# Patient Record
Sex: FEMALE | Race: WHITE | NOT HISPANIC OR LATINO
[De-identification: names, ages, dates, MRNs, and addresses within clinical notes are randomized per-mention and may not be internally consistent; named-entity substitution may affect disease eponyms.]

---

## 2017-01-23 ENCOUNTER — RESULT REVIEW (OUTPATIENT)
Age: 47
End: 2017-01-23

## 2017-06-23 ENCOUNTER — APPOINTMENT (OUTPATIENT)
Dept: MRI IMAGING | Facility: IMAGING CENTER | Age: 47
End: 2017-06-23

## 2017-06-29 ENCOUNTER — FORM ENCOUNTER (OUTPATIENT)
Age: 47
End: 2017-06-29

## 2017-06-30 ENCOUNTER — APPOINTMENT (OUTPATIENT)
Dept: MRI IMAGING | Facility: IMAGING CENTER | Age: 47
End: 2017-06-30

## 2017-06-30 ENCOUNTER — OUTPATIENT (OUTPATIENT)
Dept: OUTPATIENT SERVICES | Facility: HOSPITAL | Age: 47
LOS: 1 days | End: 2017-06-30
Payer: COMMERCIAL

## 2017-06-30 DIAGNOSIS — Z00.8 ENCOUNTER FOR OTHER GENERAL EXAMINATION: ICD-10-CM

## 2017-06-30 PROCEDURE — C8908: CPT

## 2017-06-30 PROCEDURE — C8937: CPT

## 2017-06-30 PROCEDURE — A9585: CPT

## 2017-07-06 ENCOUNTER — EMERGENCY (EMERGENCY)
Facility: HOSPITAL | Age: 47
LOS: 1 days | Discharge: HOME | End: 2017-07-06
Attending: INTERNAL MEDICINE | Admitting: INTERNAL MEDICINE
Payer: COMMERCIAL

## 2017-07-06 VITALS
HEART RATE: 85 BPM | DIASTOLIC BLOOD PRESSURE: 81 MMHG | HEIGHT: 66 IN | RESPIRATION RATE: 15 BRPM | WEIGHT: 156.09 LBS | OXYGEN SATURATION: 98 % | SYSTOLIC BLOOD PRESSURE: 120 MMHG | TEMPERATURE: 98 F

## 2017-07-06 DIAGNOSIS — Z88.0 ALLERGY STATUS TO PENICILLIN: ICD-10-CM

## 2017-07-06 DIAGNOSIS — R00.2 PALPITATIONS: ICD-10-CM

## 2017-07-06 DIAGNOSIS — R04.0 EPISTAXIS: ICD-10-CM

## 2017-07-06 DIAGNOSIS — M79.7 FIBROMYALGIA: ICD-10-CM

## 2017-07-06 DIAGNOSIS — R07.89 OTHER CHEST PAIN: ICD-10-CM

## 2017-07-06 PROCEDURE — 99284 EMERGENCY DEPT VISIT MOD MDM: CPT | Mod: 25

## 2017-07-06 NOTE — ED ADULT NURSE NOTE - PSH
S/P Breast Implant (B/L) '03    S/P Carpal Tunnel Release (R) '03    s/p Elective Breast Implant Exchange x3

## 2017-07-06 NOTE — ED ADULT NURSE NOTE - OBJECTIVE STATEMENT
Pt states she had nose bleed earlier today followed by palpitations- pt cont to feel palpitations but improved now- Pt states she had nose bleed earlier today followed by headache, pleuritic chest pain and  palpitations- pt cont to feel palpitations but improved now-

## 2017-07-07 VITALS
HEART RATE: 80 BPM | OXYGEN SATURATION: 98 % | DIASTOLIC BLOOD PRESSURE: 76 MMHG | SYSTOLIC BLOOD PRESSURE: 126 MMHG | RESPIRATION RATE: 14 BRPM | TEMPERATURE: 98 F

## 2017-07-07 LAB
ALBUMIN SERPL ELPH-MCNC: 3.7 G/DL — SIGNIFICANT CHANGE UP (ref 3.3–5)
ALP SERPL-CCNC: 75 U/L — SIGNIFICANT CHANGE UP (ref 40–120)
ALT FLD-CCNC: 47 U/L — SIGNIFICANT CHANGE UP (ref 12–78)
ANION GAP SERPL CALC-SCNC: 4 MMOL/L — LOW (ref 5–17)
AST SERPL-CCNC: 44 U/L — HIGH (ref 15–37)
BILIRUB SERPL-MCNC: 0.3 MG/DL — SIGNIFICANT CHANGE UP (ref 0.2–1.2)
BUN SERPL-MCNC: 22 MG/DL — SIGNIFICANT CHANGE UP (ref 7–23)
CALCIUM SERPL-MCNC: 8.7 MG/DL — SIGNIFICANT CHANGE UP (ref 8.5–10.1)
CHLORIDE SERPL-SCNC: 107 MMOL/L — SIGNIFICANT CHANGE UP (ref 96–108)
CK SERPL-CCNC: 118 U/L — SIGNIFICANT CHANGE UP (ref 26–192)
CO2 SERPL-SCNC: 32 MMOL/L — HIGH (ref 22–31)
CREAT SERPL-MCNC: 0.8 MG/DL — SIGNIFICANT CHANGE UP (ref 0.5–1.3)
D DIMER BLD IA.RAPID-MCNC: <150 NG/ML DDU — SIGNIFICANT CHANGE UP
GLUCOSE SERPL-MCNC: 104 MG/DL — HIGH (ref 70–99)
HCT VFR BLD CALC: 37.4 % — SIGNIFICANT CHANGE UP (ref 34.5–45)
HGB BLD-MCNC: 13.4 G/DL — SIGNIFICANT CHANGE UP (ref 11.5–15.5)
INR BLD: 0.92 RATIO — SIGNIFICANT CHANGE UP (ref 0.88–1.16)
MCHC RBC-ENTMCNC: 32.7 PG — SIGNIFICANT CHANGE UP (ref 27–34)
MCHC RBC-ENTMCNC: 35.9 GM/DL — SIGNIFICANT CHANGE UP (ref 32–36)
MCV RBC AUTO: 91.2 FL — SIGNIFICANT CHANGE UP (ref 80–100)
PLATELET # BLD AUTO: 285 K/UL — SIGNIFICANT CHANGE UP (ref 150–400)
POTASSIUM SERPL-MCNC: 4.1 MMOL/L — SIGNIFICANT CHANGE UP (ref 3.5–5.3)
POTASSIUM SERPL-SCNC: 4.1 MMOL/L — SIGNIFICANT CHANGE UP (ref 3.5–5.3)
PROT SERPL-MCNC: 7 G/DL — SIGNIFICANT CHANGE UP (ref 6–8.3)
PROTHROM AB SERPL-ACNC: 10 SEC — SIGNIFICANT CHANGE UP (ref 9.8–12.7)
RBC # BLD: 4.1 M/UL — SIGNIFICANT CHANGE UP (ref 3.8–5.2)
RBC # FLD: 11.8 % — SIGNIFICANT CHANGE UP (ref 10.3–14.5)
SODIUM SERPL-SCNC: 143 MMOL/L — SIGNIFICANT CHANGE UP (ref 135–145)
TROPONIN I SERPL-MCNC: <.015 NG/ML — SIGNIFICANT CHANGE UP (ref 0.01–0.04)
WBC # BLD: 7.1 K/UL — SIGNIFICANT CHANGE UP (ref 3.8–10.5)
WBC # FLD AUTO: 7.1 K/UL — SIGNIFICANT CHANGE UP (ref 3.8–10.5)

## 2017-07-07 PROCEDURE — 99283 EMERGENCY DEPT VISIT LOW MDM: CPT | Mod: 25

## 2017-07-07 PROCEDURE — 93005 ELECTROCARDIOGRAM TRACING: CPT

## 2017-07-07 PROCEDURE — 84484 ASSAY OF TROPONIN QUANT: CPT

## 2017-07-07 PROCEDURE — 85379 FIBRIN DEGRADATION QUANT: CPT

## 2017-07-07 PROCEDURE — 82550 ASSAY OF CK (CPK): CPT

## 2017-07-07 PROCEDURE — 36415 COLL VENOUS BLD VENIPUNCTURE: CPT

## 2017-07-07 PROCEDURE — 71046 X-RAY EXAM CHEST 2 VIEWS: CPT

## 2017-07-07 PROCEDURE — 80053 COMPREHEN METABOLIC PANEL: CPT

## 2017-07-07 PROCEDURE — 71020: CPT | Mod: 26

## 2017-07-07 PROCEDURE — 85027 COMPLETE CBC AUTOMATED: CPT

## 2017-07-07 PROCEDURE — 85610 PROTHROMBIN TIME: CPT

## 2017-07-07 NOTE — ED PROVIDER NOTE - OBJECTIVE STATEMENT
47 y/o w/m who experienced a constellation of symptoms that began with nasal bleeding controlled with pressure, head pounding, palpitations, and pleuritic chest pains. The palpitations are resolved.

## 2017-07-07 NOTE — ED PROVIDER NOTE - CARE PLAN
Principal Discharge DX:	Palpitations  Secondary Diagnosis:	Atypical chest pain  Secondary Diagnosis:	Epistaxis

## 2017-07-07 NOTE — ED ADULT NURSE REASSESSMENT NOTE - NS ED NURSE REASSESS COMMENT FT1
Pt feeling better- all testing within acceptable limits- verbalized understanding of d/c instruction-ambulated from ER in stable condition-

## 2017-07-07 NOTE — ED PROVIDER NOTE - ENMT, MLM
Airway patent, Nasal mucosa clear. Mouth with normal mucosa. Throat has no vesicles, no oropharyngeal exudates and uvula is midline. Nasal no active bleeding

## 2017-07-24 ENCOUNTER — RESULT REVIEW (OUTPATIENT)
Age: 47
End: 2017-07-24

## 2017-08-02 ENCOUNTER — LABORATORY RESULT (OUTPATIENT)
Age: 47
End: 2017-08-02

## 2017-09-03 ENCOUNTER — TRANSCRIPTION ENCOUNTER (OUTPATIENT)
Age: 47
End: 2017-09-03

## 2017-09-19 ENCOUNTER — APPOINTMENT (OUTPATIENT)
Dept: PLASTIC SURGERY | Facility: CLINIC | Age: 47
End: 2017-09-19
Payer: SELF-PAY

## 2017-09-19 VITALS
TEMPERATURE: 97.6 F | HEART RATE: 91 BPM | SYSTOLIC BLOOD PRESSURE: 132 MMHG | WEIGHT: 158.03 LBS | BODY MASS INDEX: 25.4 KG/M2 | DIASTOLIC BLOOD PRESSURE: 76 MMHG | RESPIRATION RATE: 16 BRPM | OXYGEN SATURATION: 100 % | HEIGHT: 66 IN

## 2017-09-19 PROCEDURE — ZZZZZ: CPT

## 2017-11-09 ENCOUNTER — LABORATORY RESULT (OUTPATIENT)
Age: 47
End: 2017-11-09

## 2017-11-11 ENCOUNTER — FORM ENCOUNTER (OUTPATIENT)
Age: 47
End: 2017-11-11

## 2017-11-12 ENCOUNTER — APPOINTMENT (OUTPATIENT)
Dept: MRI IMAGING | Facility: CLINIC | Age: 47
End: 2017-11-12
Payer: COMMERCIAL

## 2017-11-12 ENCOUNTER — OUTPATIENT (OUTPATIENT)
Dept: OUTPATIENT SERVICES | Facility: HOSPITAL | Age: 47
LOS: 1 days | End: 2017-11-12
Payer: COMMERCIAL

## 2017-11-12 DIAGNOSIS — Z00.8 ENCOUNTER FOR OTHER GENERAL EXAMINATION: ICD-10-CM

## 2017-11-12 PROCEDURE — 70553 MRI BRAIN STEM W/O & W/DYE: CPT

## 2017-11-12 PROCEDURE — A9585: CPT

## 2017-11-12 PROCEDURE — 70553 MRI BRAIN STEM W/O & W/DYE: CPT | Mod: 26

## 2017-11-13 ENCOUNTER — LABORATORY RESULT (OUTPATIENT)
Age: 47
End: 2017-11-13

## 2018-02-12 ENCOUNTER — APPOINTMENT (OUTPATIENT)
Dept: PLASTIC SURGERY | Facility: CLINIC | Age: 48
End: 2018-02-12
Payer: SELF-PAY

## 2018-06-11 ENCOUNTER — APPOINTMENT (OUTPATIENT)
Dept: PLASTIC SURGERY | Facility: CLINIC | Age: 48
End: 2018-06-11
Payer: SELF-PAY

## 2018-06-11 DIAGNOSIS — Z41.1 ENCOUNTER FOR COSMETIC SURGERY: ICD-10-CM

## 2018-08-25 ENCOUNTER — TRANSCRIPTION ENCOUNTER (OUTPATIENT)
Age: 48
End: 2018-08-25

## 2018-09-10 PROBLEM — M79.7 FIBROMYALGIA: Chronic | Status: ACTIVE | Noted: 2017-07-06

## 2018-09-15 ENCOUNTER — EMERGENCY (EMERGENCY)
Facility: HOSPITAL | Age: 48
LOS: 1 days | Discharge: HOME | End: 2018-09-15
Attending: EMERGENCY MEDICINE
Payer: COMMERCIAL

## 2018-09-15 VITALS
HEART RATE: 83 BPM | HEIGHT: 65 IN | SYSTOLIC BLOOD PRESSURE: 177 MMHG | RESPIRATION RATE: 18 BRPM | OXYGEN SATURATION: 99 % | WEIGHT: 130.07 LBS | TEMPERATURE: 97 F | DIASTOLIC BLOOD PRESSURE: 77 MMHG

## 2018-09-15 VITALS
SYSTOLIC BLOOD PRESSURE: 130 MMHG | HEART RATE: 77 BPM | TEMPERATURE: 98 F | RESPIRATION RATE: 18 BRPM | DIASTOLIC BLOOD PRESSURE: 67 MMHG | OXYGEN SATURATION: 100 %

## 2018-09-15 LAB
CK MB BLD-MCNC: 1.8 % — SIGNIFICANT CHANGE UP (ref 0–3.5)
CK MB CFR SERPL CALC: 2.4 NG/ML — SIGNIFICANT CHANGE UP (ref 0–3.8)
CK SERPL-CCNC: 130 U/L — SIGNIFICANT CHANGE UP (ref 25–170)
TROPONIN T, HIGH SENSITIVITY RESULT: <6 NG/L — SIGNIFICANT CHANGE UP (ref 0–51)

## 2018-09-15 PROCEDURE — 82550 ASSAY OF CK (CPK): CPT

## 2018-09-15 PROCEDURE — 82553 CREATINE MB FRACTION: CPT

## 2018-09-15 PROCEDURE — 99283 EMERGENCY DEPT VISIT LOW MDM: CPT

## 2018-09-15 PROCEDURE — 84484 ASSAY OF TROPONIN QUANT: CPT

## 2018-09-15 PROCEDURE — 99284 EMERGENCY DEPT VISIT MOD MDM: CPT

## 2018-09-15 RX ORDER — IBUPROFEN 200 MG
600 TABLET ORAL ONCE
Qty: 0 | Refills: 0 | Status: COMPLETED | OUTPATIENT
Start: 2018-09-15 | End: 2018-09-15

## 2018-09-15 RX ADMIN — Medication 600 MILLIGRAM(S): at 15:18

## 2018-09-15 RX ADMIN — Medication 600 MILLIGRAM(S): at 15:48

## 2018-09-15 NOTE — ED PROVIDER NOTE - NS ED ROS FT
Constitutional: denies fevers, chills, night sweats, weight loss  HEENT: denies visual changes, hearing changes, rhinitis, odynophagia, or dysphagia  Cardiovascular: denies edema  Respiratory: denies SOB, wheezing  Gastrointestinal: denies N/V/D, abdominal pain, hematochezia, melena  : denies dysuria, hematuria  MSK: denies weakness, joint pain  Skin: denies new rashes or masses  Heme: denies bleeding  Neuro: denies headache

## 2018-09-15 NOTE — ED PROVIDER NOTE - PROGRESS NOTE DETAILS
Pt condition improved after motrin. Offered pt xray however pt declined. Risks and benefits of declining xray explained to patient who wished to follow up as an outpt on Monday if symptoms persist. Pt without signs of infection or ischemia. Ok to discharge at this time. No cough.

## 2018-09-15 NOTE — ED PROVIDER NOTE - OBJECTIVE STATEMENT
The patient is a 48y Female with a PMH of fibromyalgia, IBS, palpitations, and an active UTI (being treated with sulfamethoxazole started on 9/12/18) who presents to the ED complaining of chest pain, numbness in her finger tips, lightheadedness. She reports that she was awoken from sleep at 0500 by the pain. She reports it is tender to palpation, and worsened by deep inspiration. The pain is non radiating and sharp in nature. She goes to kick boxing 2-3 times per week and has not had these symptoms previously. She reports some SOB and palpitations since the onset of the chest pain. She reports she has a long hx of palpitations and was given a heart monitor for sometime but never an official dx and is not on any medications for the palpitations. She denies cardiac hx. She has had some nausea however denies V/D and is tolerating PO. She reports the numbness in her fingertips is improving.

## 2018-09-15 NOTE — ED ADULT NURSE NOTE - OBJECTIVE STATEMENT
48 yrs old female with no medical history present to the ER for left sided chest pain associated with numbness to left hand. Pt report that she does kick boxing 3 days a week . As per pt she started to experience reproducible left side chest pain @5am this morning ; reports pain level of 6/10 when she moves her arm and aching in quality. Pt denies taking any OTC meds for pain. Pt also report feeling palpitations which she state has resolved. Pt appears anxious and report family history of heart attack. Denies nausea, vomiting /diarrhea/ dizziness.

## 2018-09-15 NOTE — ED ADULT NURSE NOTE - CHPI ED NUR SYMPTOMS NEG
no vomiting/no chills/no diaphoresis/no fever/no nausea/no back pain/no congestion/no dizziness/no syncope/no shortness of breath

## 2018-09-15 NOTE — ED PROVIDER NOTE - ATTENDING CONTRIBUTION TO CARE
49 yo female, hx of palpitations of unknown etiology, previously worked up, now with intermittent sharp mild CP this AM, no LOC.  On my exam, well appearing, smiling, conversant, a bit of an anxious affect.  EKG without STEMI.  Will check labs/high sensitivity trop, reassess, r/o ACS.  PERC negative, doubt PE.

## 2018-09-15 NOTE — ED PROVIDER NOTE - PHYSICAL EXAMINATION
GEN: calm, cooperative, ENT: mucous membranes moist, HEAD: NCAT, CV: S1 S2 no murmurs, RESP: no respiratory distress, ABD: no abdominal TTP, MSK: moves all extremities, NEURO: awake, alert, oriented, PSYCH: anxious affect

## 2018-09-30 ENCOUNTER — FORM ENCOUNTER (OUTPATIENT)
Age: 48
End: 2018-09-30

## 2018-10-01 ENCOUNTER — OUTPATIENT (OUTPATIENT)
Dept: OUTPATIENT SERVICES | Facility: HOSPITAL | Age: 48
LOS: 1 days | End: 2018-10-01
Payer: COMMERCIAL

## 2018-10-01 ENCOUNTER — APPOINTMENT (OUTPATIENT)
Dept: RADIOLOGY | Facility: IMAGING CENTER | Age: 48
End: 2018-10-01
Payer: COMMERCIAL

## 2018-10-01 ENCOUNTER — OTHER (OUTPATIENT)
Age: 48
End: 2018-10-01

## 2018-10-01 DIAGNOSIS — Z00.8 ENCOUNTER FOR OTHER GENERAL EXAMINATION: ICD-10-CM

## 2018-10-01 PROBLEM — K58.9 IRRITABLE BOWEL SYNDROME WITHOUT DIARRHEA: Chronic | Status: ACTIVE | Noted: 2018-09-15

## 2018-10-01 PROCEDURE — 73120 X-RAY EXAM OF HAND: CPT

## 2018-10-01 PROCEDURE — 73120 X-RAY EXAM OF HAND: CPT | Mod: 26,RT

## 2018-10-01 PROCEDURE — 73110 X-RAY EXAM OF WRIST: CPT

## 2018-10-01 PROCEDURE — 73110 X-RAY EXAM OF WRIST: CPT | Mod: 26,RT

## 2018-10-04 ENCOUNTER — OUTPATIENT (OUTPATIENT)
Dept: OUTPATIENT SERVICES | Facility: HOSPITAL | Age: 48
LOS: 1 days | End: 2018-10-04

## 2018-10-04 VITALS
HEART RATE: 80 BPM | DIASTOLIC BLOOD PRESSURE: 70 MMHG | SYSTOLIC BLOOD PRESSURE: 110 MMHG | WEIGHT: 130.95 LBS | HEIGHT: 64.5 IN | TEMPERATURE: 98 F | RESPIRATION RATE: 15 BRPM

## 2018-10-04 DIAGNOSIS — S62.024A NONDISPLACED FRACTURE OF MIDDLE THIRD OF NAVICULAR [SCAPHOID] BONE OF RIGHT WRIST, INITIAL ENCOUNTER FOR CLOSED FRACTURE: ICD-10-CM

## 2018-10-04 DIAGNOSIS — S62.109A FRACTURE OF UNSPECIFIED CARPAL BONE, UNSPECIFIED WRIST, INITIAL ENCOUNTER FOR CLOSED FRACTURE: ICD-10-CM

## 2018-10-04 LAB
HCT VFR BLD CALC: 37.1 % — SIGNIFICANT CHANGE UP (ref 34.5–45)
HGB BLD-MCNC: 11.9 G/DL — SIGNIFICANT CHANGE UP (ref 11.5–15.5)
MCHC RBC-ENTMCNC: 30 PG — SIGNIFICANT CHANGE UP (ref 27–34)
MCHC RBC-ENTMCNC: 32.1 % — SIGNIFICANT CHANGE UP (ref 32–36)
MCV RBC AUTO: 93.5 FL — SIGNIFICANT CHANGE UP (ref 80–100)
NRBC # FLD: 0 — SIGNIFICANT CHANGE UP
PLATELET # BLD AUTO: 281 K/UL — SIGNIFICANT CHANGE UP (ref 150–400)
PMV BLD: 10.2 FL — SIGNIFICANT CHANGE UP (ref 7–13)
RBC # BLD: 3.97 M/UL — SIGNIFICANT CHANGE UP (ref 3.8–5.2)
RBC # FLD: 12.4 % — SIGNIFICANT CHANGE UP (ref 10.3–14.5)
WBC # BLD: 3.76 K/UL — LOW (ref 3.8–10.5)
WBC # FLD AUTO: 3.76 K/UL — LOW (ref 3.8–10.5)

## 2018-10-04 NOTE — H&P PST ADULT - SKIN
Render Post-Care Instructions In Note?: no
Number Of Freeze-Thaw Cycles: 1 freeze-thaw cycle
Duration Of Freeze Thaw-Cycle (Seconds): 5
Detail Level: Simple
Pared With?: 15 blade
Detail Level: Detailed
Consent: Advised patient of the risk of possible scars and pigmentation at LN2 site, patient states she understand the risk
Medical Necessity Information: It is in your best interest to select a reason for this procedure from the list below. All of these items fulfill various CMS LCD requirements except the new and changing color options.
No lesions; no rash

## 2018-10-04 NOTE — H&P PST ADULT - ASSESSMENT
49 y/o female with right wrist fracture.  Pt reports she punched a wall and hit a stud on 10/1/18. Scheduled for Right scaphoid fracture ORIF 10/8/18.

## 2018-10-04 NOTE — H&P PST ADULT - NSANTHOSAYNRD_GEN_A_CORE
No. JUANY screening performed.  STOP BANG Legend: 0-2 = LOW Risk; 3-4 = INTERMEDIATE Risk; 5-8 = HIGH Risk

## 2018-10-04 NOTE — H&P PST ADULT - PROBLEM SELECTOR PLAN 1
Right scaphoid fracture ORIF 10/8/18.     CBC     Pre-op instructions, and  antibacterial soap given and explained

## 2018-10-04 NOTE — H&P PST ADULT - HISTORY OF PRESENT ILLNESS
47 y/o female with right wrist fracture.  Pt reports she punched a wall and hit a stud on 10/1/18. Scheduled for Right scaphoid fracture ORIF 10/8/18.

## 2018-10-04 NOTE — H&P PST ADULT - MUSCULOSKELETAL COMMENTS
47 y/o female with right wrist fracture.  Pt reports she punched a wall and hit a stud on 10/1/18. Scheduled for Right scaphoid fracture ORIF 10/8/18. Pt reports she punched a wall and hit a stud on 10/1/18, sustained right wrist fx.  Scheduled for Right scaphoid fracture ORIF 10/8/18. right wrist with splint and ace wrap. Fingers, right hand warm mobile, sensation intact to touch

## 2018-10-04 NOTE — H&P PST ADULT - SKIN/BREAST COMMENTS
Pt reports breat augmentation 2004, with several revisions due to leakage of implants,  most recently 2012t

## 2018-10-04 NOTE — H&P PST ADULT - PMH
Fibromyalgia    IBS (irritable bowel syndrome) Fibromyalgia    IBS (irritable bowel syndrome)    Wrist fracture  right

## 2018-10-04 NOTE — H&P PST ADULT - PSH
S/P Breast Implant (B/L) '03    S/P Carpal Tunnel Release (R) '03    s/p Elective Breast Implant Exchange x3 S/P Breast Implant (B/L) '03  most recently 2012  S/P Carpal Tunnel Release (R) '03    s/p Elective Breast Implant Exchange x3

## 2018-10-04 NOTE — H&P PST ADULT - FAMILY HISTORY
Father  Still living? No  Family history of acute myocardial infarction, Age at diagnosis: Age Unknown     Mother  Still living? Yes, Estimated age: Age Unknown  Family history of breast cancer, Age at diagnosis: Age Unknown

## 2018-10-04 NOTE — H&P PST ADULT - NEGATIVE NEUROLOGICAL SYMPTOMS
no transient paralysis/no paresthesias/no loss of consciousness/no hemiparesis/no confusion/no difficulty walking/no tremors/no loss of sensation/no facial palsy/no weakness/no generalized seizures/no syncope/no focal seizures/no vertigo

## 2018-10-07 ENCOUNTER — TRANSCRIPTION ENCOUNTER (OUTPATIENT)
Age: 48
End: 2018-10-07

## 2018-10-08 ENCOUNTER — OUTPATIENT (OUTPATIENT)
Dept: OUTPATIENT SERVICES | Facility: HOSPITAL | Age: 48
LOS: 1 days | Discharge: HOME | End: 2018-10-08

## 2018-10-08 VITALS
HEART RATE: 73 BPM | DIASTOLIC BLOOD PRESSURE: 54 MMHG | SYSTOLIC BLOOD PRESSURE: 119 MMHG | WEIGHT: 130.95 LBS | TEMPERATURE: 98 F | OXYGEN SATURATION: 100 % | HEIGHT: 64.5 IN | RESPIRATION RATE: 16 BRPM

## 2018-10-08 VITALS
TEMPERATURE: 98 F | SYSTOLIC BLOOD PRESSURE: 121 MMHG | OXYGEN SATURATION: 100 % | DIASTOLIC BLOOD PRESSURE: 76 MMHG | HEART RATE: 71 BPM | RESPIRATION RATE: 15 BRPM

## 2018-10-08 DIAGNOSIS — S62.024A NONDISPLACED FRACTURE OF MIDDLE THIRD OF NAVICULAR [SCAPHOID] BONE OF RIGHT WRIST, INITIAL ENCOUNTER FOR CLOSED FRACTURE: ICD-10-CM

## 2018-10-08 RX ORDER — OXYCODONE HYDROCHLORIDE 5 MG/1
1 TABLET ORAL
Qty: 15 | Refills: 0 | OUTPATIENT
Start: 2018-10-08

## 2018-10-08 NOTE — ASU DISCHARGE PLAN (ADULT/PEDIATRIC). - NOTIFY
Unable to Urinate/Fever greater than 101/Numbness, color, or temperature change to extremity/Persistent Nausea and Vomiting/Pain not relieved by Medications/Swelling that continues/Inability to Tolerate Liquids or Foods/Bleeding that does not stop

## 2018-10-08 NOTE — ASU DISCHARGE PLAN (ADULT/PEDIATRIC). - NURSING INSTRUCTIONS
Don't take Tylenol with percocet as there is Tylenol in percocet  Ice 20 minutes on/ 20 minutes off   Take stool softener as needed for possible constipation from pain meds

## 2018-10-08 NOTE — ASU DISCHARGE PLAN (ADULT/PEDIATRIC). - ACTIVITY LEVEL
See sheet no sports/gym/no swimming, no hot tubs/no exercise/elevate extremity/no heavy lifting/no tub baths

## 2018-10-08 NOTE — ASU DISCHARGE PLAN (ADULT/PEDIATRIC). - MEDICATION SUMMARY - MEDICATIONS TO TAKE
I will START or STAY ON the medications listed below when I get home from the hospital:    acetaminophen-oxyCODONE 325 mg-5 mg oral tablet  -- 1-2 tab(s) by mouth every 4-6 hours, As Needed MDD:6  -- Caution federal law prohibits the transfer of this drug to any person other  than the person for whom it was prescribed.  May cause drowsiness.  Alcohol may intensify this effect.  Use care when operating dangerous machinery.  This prescription cannot be refilled.  This product contains acetaminophen.  Do not use  with any other product containing acetaminophen to prevent possible liver damage.  Using more of this medication than prescribed may cause serious breathing problems.    -- Indication: For Pain control    Linzess 290 mcg oral capsule  -- orally 2 times a day  -- Indication: For Home med

## 2018-10-30 ENCOUNTER — APPOINTMENT (OUTPATIENT)
Dept: OPHTHALMOLOGY | Facility: CLINIC | Age: 48
End: 2018-10-30

## 2018-12-27 PROBLEM — S62.109A FRACTURE OF UNSPECIFIED CARPAL BONE, UNSPECIFIED WRIST, INITIAL ENCOUNTER FOR CLOSED FRACTURE: Chronic | Status: ACTIVE | Noted: 2018-10-04

## 2019-01-02 ENCOUNTER — OUTPATIENT (OUTPATIENT)
Dept: OUTPATIENT SERVICES | Facility: HOSPITAL | Age: 49
LOS: 1 days | Discharge: HOME | End: 2019-01-02
Payer: MEDICARE

## 2019-01-02 VITALS
HEART RATE: 76 BPM | DIASTOLIC BLOOD PRESSURE: 77 MMHG | WEIGHT: 129.85 LBS | RESPIRATION RATE: 18 BRPM | TEMPERATURE: 98 F | OXYGEN SATURATION: 100 % | HEIGHT: 65 IN | SYSTOLIC BLOOD PRESSURE: 113 MMHG

## 2019-01-02 DIAGNOSIS — K58.9 IRRITABLE BOWEL SYNDROME WITHOUT DIARRHEA: ICD-10-CM

## 2019-01-02 DIAGNOSIS — Z01.818 ENCOUNTER FOR OTHER PREPROCEDURAL EXAMINATION: ICD-10-CM

## 2019-01-02 DIAGNOSIS — M20.21 HALLUX RIGIDUS, RIGHT FOOT: ICD-10-CM

## 2019-01-02 DIAGNOSIS — Z98.890 OTHER SPECIFIED POSTPROCEDURAL STATES: Chronic | ICD-10-CM

## 2019-01-02 DIAGNOSIS — M79.673 PAIN IN UNSPECIFIED FOOT: ICD-10-CM

## 2019-01-02 DIAGNOSIS — Z01.812 ENCOUNTER FOR PREPROCEDURAL LABORATORY EXAMINATION: ICD-10-CM

## 2019-01-02 LAB
ANION GAP SERPL CALC-SCNC: 6 MMOL/L — SIGNIFICANT CHANGE UP (ref 5–17)
BASOPHILS # BLD AUTO: 0.04 K/UL — SIGNIFICANT CHANGE UP (ref 0–0.2)
BASOPHILS NFR BLD AUTO: 1.2 % — SIGNIFICANT CHANGE UP (ref 0–2)
BUN SERPL-MCNC: 20 MG/DL — SIGNIFICANT CHANGE UP (ref 7–23)
CALCIUM SERPL-MCNC: 8.6 MG/DL — SIGNIFICANT CHANGE UP (ref 8.5–10.1)
CHLORIDE SERPL-SCNC: 105 MMOL/L — SIGNIFICANT CHANGE UP (ref 96–108)
CO2 SERPL-SCNC: 31 MMOL/L — SIGNIFICANT CHANGE UP (ref 22–31)
CREAT SERPL-MCNC: 0.66 MG/DL — SIGNIFICANT CHANGE UP (ref 0.5–1.3)
EOSINOPHIL # BLD AUTO: 0.05 K/UL — SIGNIFICANT CHANGE UP (ref 0–0.5)
EOSINOPHIL NFR BLD AUTO: 1.5 % — SIGNIFICANT CHANGE UP (ref 0–6)
GLUCOSE SERPL-MCNC: 97 MG/DL — SIGNIFICANT CHANGE UP (ref 70–99)
HCT VFR BLD CALC: 36.9 % — SIGNIFICANT CHANGE UP (ref 34.5–45)
HGB BLD-MCNC: 12.3 G/DL — SIGNIFICANT CHANGE UP (ref 11.5–15.5)
IMM GRANULOCYTES NFR BLD AUTO: 0 % — SIGNIFICANT CHANGE UP (ref 0–1.5)
INR BLD: 0.99 RATIO — SIGNIFICANT CHANGE UP (ref 0.88–1.16)
LYMPHOCYTES # BLD AUTO: 1.38 K/UL — SIGNIFICANT CHANGE UP (ref 1–3.3)
LYMPHOCYTES # BLD AUTO: 41.3 % — SIGNIFICANT CHANGE UP (ref 13–44)
MCHC RBC-ENTMCNC: 30.4 PG — SIGNIFICANT CHANGE UP (ref 27–34)
MCHC RBC-ENTMCNC: 33.3 GM/DL — SIGNIFICANT CHANGE UP (ref 32–36)
MCV RBC AUTO: 91.1 FL — SIGNIFICANT CHANGE UP (ref 80–100)
MONOCYTES # BLD AUTO: 0.4 K/UL — SIGNIFICANT CHANGE UP (ref 0–0.9)
MONOCYTES NFR BLD AUTO: 12 % — SIGNIFICANT CHANGE UP (ref 2–14)
NEUTROPHILS # BLD AUTO: 1.47 K/UL — LOW (ref 1.8–7.4)
NEUTROPHILS NFR BLD AUTO: 44 % — SIGNIFICANT CHANGE UP (ref 43–77)
PLATELET # BLD AUTO: 243 K/UL — SIGNIFICANT CHANGE UP (ref 150–400)
POTASSIUM SERPL-MCNC: 3.8 MMOL/L — SIGNIFICANT CHANGE UP (ref 3.5–5.3)
POTASSIUM SERPL-SCNC: 3.8 MMOL/L — SIGNIFICANT CHANGE UP (ref 3.5–5.3)
PROTHROM AB SERPL-ACNC: 11.1 SEC — SIGNIFICANT CHANGE UP (ref 10–12.9)
RBC # BLD: 4.05 M/UL — SIGNIFICANT CHANGE UP (ref 3.8–5.2)
RBC # FLD: 12.8 % — SIGNIFICANT CHANGE UP (ref 10.3–14.5)
SODIUM SERPL-SCNC: 142 MMOL/L — SIGNIFICANT CHANGE UP (ref 135–145)
WBC # BLD: 3.34 K/UL — LOW (ref 3.8–10.5)
WBC # FLD AUTO: 3.34 K/UL — LOW (ref 3.8–10.5)

## 2019-01-02 PROCEDURE — 93010 ELECTROCARDIOGRAM REPORT: CPT | Mod: NC

## 2019-01-02 RX ORDER — LINACLOTIDE 145 UG/1
0 CAPSULE, GELATIN COATED ORAL
Qty: 0 | Refills: 0 | COMMUNITY

## 2019-01-02 NOTE — H&P PST ADULT - ASSESSMENT
right hallux rigidus  CAPRINI SCORE    AGE RELATED RISK FACTORS                                                       MOBILITY RELATED FACTORS  [x ] Age 41-60 years                                            (1 Point)                  [ ] Bed rest                                                        (1 Point)  [ ] Age: 61-74 years                                           (2 Points)                [ ] Plaster cast                                                   (2 Points)  [ ] Age= 75 years                                              (3 Points)                 [ ] Bed bound for more than 72 hours                   (2 Points)    DISEASE RELATED RISK FACTORS                                               GENDER SPECIFIC FACTORS  [ ] Edema in the lower extremities                       (1 Point)                  [ ] Pregnancy                                                     (1 Point)  [ ] Varicose veins                                               (1 Point)                  [ ] Post-partum < 6 weeks                                   (1 Point)             [ ] BMI > 25 Kg/m2                                            (1 Point)                  [ ] Hormonal therapy  or oral contraception            (1 Point)                 [ ] Sepsis (in the previous month)                        (1 Point)                  [ ] History of pregnancy complications  [ ] Pneumonia or serious lung disease                                               [ ] Unexplained or recurrent                       (1 Point)           (in the previous month)                               (1 Point)  [ ] Abnormal pulmonary function test                     (1 Point)                 SURGERY RELATED RISK FACTORS  [ ] Acute myocardial infarction                              (1 Point)                 [ ]  Section                                            (1 Point)  [ ] Congestive heart failure (in the previous month)  (1 Point)                 [ ] Minor surgery                                                 (1 Point)   [ ] Inflammatory bowel disease                             (1 Point)                 [x ] Arthroscopic surgery                                        (2 Points)  [ ] Central venous access                                    (2 Points)                [ ] General surgery lasting more than 45 minutes   (2 Points)       [ ] Stroke (in the previous month)                          (5 Points)               [ ] Elective arthroplasty                                        (5 Points)                                                                                                                                               HEMATOLOGY RELATED FACTORS                                                 TRAUMA RELATED RISK FACTORS  [ ] Prior episodes of VTE                                     (3 Points)                 [ ] Fracture of the hip, pelvis, or leg                       (5 Points)  [ ] Positive family history for VTE                         (3 Points)                 [ ] Acute spinal cord injury (in the previous month)  (5 Points)  [ ] Prothrombin 07886 A                                      (3 Points)                 [ ] Paralysis  (less than 1 month)                          (5 Points)  [ ] Factor V Leiden                                             (3 Points)                 [ ] Multiple Trauma within 1 month                         (5 Points)  [ ] Lupus anticoagulants                                     (3 Points)                                                           [ ] Anticardiolipin antibodies                                (3 Points)                                                       [ ] High homocysteine in the blood                      (3 Points)                                             [ ] Other congenital or acquired thrombophilia       (3 Points)                                                [ ] Heparin induced thrombocytopenia                  (3 Points)                                          Total Score [    3      ]

## 2019-01-02 NOTE — H&P PST ADULT - PSH
S/P Breast Implant (B/L) '03  most recently 2012  S/P Carpal Tunnel Release (R) '03    s/p Elective Breast Implant Exchange x3    S/P wrist surgery  Right ( October 2018 )

## 2019-01-02 NOTE — H&P PST ADULT - VISION (WITH CORRECTIVE LENSES IF THE PATIENT USUALLY WEARS THEM):
Wear contact lenses/Partially impaired: cannot see medication labels or newsprint, but can see obstacles in path, and the surrounding layout; can count fingers at arm's length

## 2019-01-02 NOTE — H&P PST ADULT - HISTORY OF PRESENT ILLNESS
47 yo female c/o right foot pain secondary to hallux rigidus - scheduled for right foot  cheilectomy 47 yo female c/o right foot pain secondary to hallux rigidus - scheduled for right foot  cheilectomy and phalangeal osteotomy

## 2019-01-03 RX ORDER — SODIUM CHLORIDE 9 MG/ML
3 INJECTION INTRAMUSCULAR; INTRAVENOUS; SUBCUTANEOUS EVERY 8 HOURS
Qty: 0 | Refills: 0 | Status: DISCONTINUED | OUTPATIENT
Start: 2019-01-14 | End: 2019-01-29

## 2019-01-13 ENCOUNTER — TRANSCRIPTION ENCOUNTER (OUTPATIENT)
Age: 49
End: 2019-01-13

## 2019-01-14 ENCOUNTER — OUTPATIENT (OUTPATIENT)
Dept: OUTPATIENT SERVICES | Facility: HOSPITAL | Age: 49
LOS: 1 days | Discharge: HOME | End: 2019-01-14

## 2019-01-14 VITALS
DIASTOLIC BLOOD PRESSURE: 64 MMHG | OXYGEN SATURATION: 99 % | HEART RATE: 70 BPM | RESPIRATION RATE: 16 BRPM | TEMPERATURE: 98 F | HEIGHT: 65 IN | SYSTOLIC BLOOD PRESSURE: 122 MMHG | WEIGHT: 129.85 LBS

## 2019-01-14 VITALS
RESPIRATION RATE: 16 BRPM | TEMPERATURE: 97 F | DIASTOLIC BLOOD PRESSURE: 74 MMHG | HEART RATE: 74 BPM | SYSTOLIC BLOOD PRESSURE: 114 MMHG | OXYGEN SATURATION: 100 %

## 2019-01-14 DIAGNOSIS — Z98.890 OTHER SPECIFIED POSTPROCEDURAL STATES: Chronic | ICD-10-CM

## 2019-01-14 RX ORDER — SODIUM CHLORIDE 9 MG/ML
1000 INJECTION, SOLUTION INTRAVENOUS
Qty: 0 | Refills: 0 | Status: DISCONTINUED | OUTPATIENT
Start: 2019-01-14 | End: 2019-01-14

## 2019-01-14 RX ORDER — ONDANSETRON 8 MG/1
4 TABLET, FILM COATED ORAL ONCE
Qty: 0 | Refills: 0 | Status: DISCONTINUED | OUTPATIENT
Start: 2019-01-14 | End: 2019-01-14

## 2019-01-14 RX ORDER — ACETAMINOPHEN 500 MG
1000 TABLET ORAL ONCE
Qty: 0 | Refills: 0 | Status: DISCONTINUED | OUTPATIENT
Start: 2019-01-14 | End: 2019-01-14

## 2019-01-14 RX ORDER — MORPHINE SULFATE 50 MG/1
4 CAPSULE, EXTENDED RELEASE ORAL
Qty: 0 | Refills: 0 | Status: DISCONTINUED | OUTPATIENT
Start: 2019-01-14 | End: 2019-01-14

## 2019-01-14 NOTE — ASU PREOP CHECKLIST - PATIENT'S PERSONAL PROPERTY GIVEN TO
Telephone Encounter by Berta Landa RN at 04/05/17 06:59 PM     Author:  Berta Landa RN Service:  (none) Author Type:  Registered Nurse     Filed:  04/05/17 06:59 PM Encounter Date:  4/3/2017 Status:  Signed     :  Berta Landa RN (Registered Nurse)       From: Supriya Miller  To: Ina Ray MD  Sent: 4/3/2017  9:35 PM CDT  Subject: Lab Tests  Test Related Questions    Received a phone message regarding my Vitamin D results.  If the results   can be posted to [facility] that would be great.    If the results cannot be posted, please call me on my cell at [phone] or my work number at [phone].    Thank you    Supriya Miller       Revision History        Date/Time User Provider Type Action    > 04/05/17 06:59 PM Berta Landa, RN Registered Nurse Sign    Attribution information within the note text is not available.            
Telephone Encounter by Ninoska Zuleta at 04/17/17 09:41 AM     Author:  Ninoska Zuleta Service:  (none) Author Type:  Patient      Filed:  04/17/17 09:41 AM Encounter Date:  4/3/2017 Status:  Signed     :  Ninoska Zuleta (Patient )            See result note. Patient ok'd to take 5000 IU's daily per Dr. Ray.[LK1.1M]      Revision History        User Key Date/Time User Provider Type Action    > LK1.1 04/17/17 09:41 AM Ninoska Zuleta Patient  Sign    M - Manual            
on unit

## 2019-01-14 NOTE — ASU DISCHARGE PLAN (ADULT/PEDIATRIC). - NURSING INSTRUCTIONS
When taking pain/narcotic medications - take with food as it can cause nausea if taken on an empty stomach, and know it may cause constipation. To prevent constipation increase fluids and fiber in diet. You may take stool softeners (such as Colace) and follow directions as printed on bottle. - Do NOT drive while on narcotics.     You were given IV antibiotics in the OR. IF you are prescribed oral antibiotics to take at home. Please follow directions on the bottle and start your first dose of antibiotics before bedtime, and schedule your own timing until you finish all antibiotics. DO NOT DISCONTINUE ON YOUR OWN.     Refer to medication reconcillation sheet attached with discharge instruction paperwork.

## 2019-01-14 NOTE — ASU DISCHARGE PLAN (ADULT/PEDIATRIC). - ACTIVITY LEVEL
Nothing heavier than 5 pounds at the affected extremity to right foot. Nothing heavier than 5 pounds at the affected extremity/no weight bearing no weight bearing/to right foot with crutches. Nothing heavier than 5 pounds at the affected extremity

## 2019-01-14 NOTE — ASU DISCHARGE PLAN (ADULT/PEDIATRIC). - SPECIAL INSTRUCTIONS
Refer to MD's discharge post-operative instruction sheet.  Apply ice to the affected extremity for the next 24 hours (20 minutes on and 20 minutes off).  Elevate the extremity to decrease swelling.  If there is blood on the dressing, as long as it is dry, no need to worry. If bleeding continues continuously onto clothes, please contact MD.    IF prescribed aspirin to take at home, take according to instructions on the pill bottle. Because aspirin thins the blood, avoid using sharp razors, or sharp objects. If cut, apply pressure on the wound for 2 minutes to stop bleeding.    IF you have steri strips, you may shower with steri strips on. After showering, pat dry steri strips.  Do not rub them.  They will curl up and fall off by themselves within 7 days. Avoid using creams, lotions, or ointments around the steri strip area.    If you wear contact lenses, please refrain from wearing contact lenses for the next 24 hours to avoid corneal abrasion due to possible dry eyes from surgery. Wear corrective lenses.

## 2019-01-14 NOTE — BRIEF OPERATIVE NOTE - PROCEDURE
<<-----Click on this checkbox to enter Procedure Cheilectomy of metatarsal of right foot  01/14/2019    Active  BRAXTON

## 2019-01-14 NOTE — ASU DISCHARGE PLAN (ADULT/PEDIATRIC). - NOTIFY
Bleeding that does not stop/Pain not relieved by Medications/Fever greater than 101/Signs of infection: redness around surgical site, hot and tender to the touch, pus drainage of any kind accompanied by foul odor with a temperature of 101.0, or higher.

## 2019-01-14 NOTE — ASU PATIENT PROFILE, ADULT - VISION (WITH CORRECTIVE LENSES IF THE PATIENT USUALLY WEARS THEM):
Partially impaired: cannot see medication labels or newsprint, but can see obstacles in path, and the surrounding layout; can count fingers at arm's length/Wear contact lenses

## 2019-01-17 DIAGNOSIS — M79.671 PAIN IN RIGHT FOOT: ICD-10-CM

## 2019-01-17 DIAGNOSIS — Z79.891 LONG TERM (CURRENT) USE OF OPIATE ANALGESIC: ICD-10-CM

## 2019-01-17 DIAGNOSIS — Z88.2 ALLERGY STATUS TO SULFONAMIDES: ICD-10-CM

## 2019-01-17 DIAGNOSIS — M79.7 FIBROMYALGIA: ICD-10-CM

## 2019-01-17 DIAGNOSIS — M20.21 HALLUX RIGIDUS, RIGHT FOOT: ICD-10-CM

## 2019-01-17 DIAGNOSIS — Z87.891 PERSONAL HISTORY OF NICOTINE DEPENDENCE: ICD-10-CM

## 2019-02-06 ENCOUNTER — OTHER (OUTPATIENT)
Age: 49
End: 2019-02-06

## 2019-02-12 ENCOUNTER — FORM ENCOUNTER (OUTPATIENT)
Age: 49
End: 2019-02-12

## 2019-02-13 ENCOUNTER — APPOINTMENT (OUTPATIENT)
Dept: MRI IMAGING | Facility: CLINIC | Age: 49
End: 2019-02-13
Payer: COMMERCIAL

## 2019-02-13 ENCOUNTER — OUTPATIENT (OUTPATIENT)
Dept: OUTPATIENT SERVICES | Facility: HOSPITAL | Age: 49
LOS: 1 days | End: 2019-02-13
Payer: COMMERCIAL

## 2019-02-13 DIAGNOSIS — Z00.8 ENCOUNTER FOR OTHER GENERAL EXAMINATION: ICD-10-CM

## 2019-02-13 DIAGNOSIS — Z98.890 OTHER SPECIFIED POSTPROCEDURAL STATES: Chronic | ICD-10-CM

## 2019-02-13 DIAGNOSIS — Z98.82 BREAST IMPLANT STATUS: ICD-10-CM

## 2019-02-13 DIAGNOSIS — Z80.3 FAMILY HISTORY OF MALIGNANT NEOPLASM OF BREAST: ICD-10-CM

## 2019-02-13 PROCEDURE — C8937: CPT

## 2019-02-13 PROCEDURE — A9585: CPT

## 2019-02-13 PROCEDURE — C8908: CPT

## 2019-02-13 PROCEDURE — 77049 MRI BREAST C-+ W/CAD BI: CPT | Mod: 26

## 2019-03-05 ENCOUNTER — OTHER (OUTPATIENT)
Age: 49
End: 2019-03-05

## 2019-04-04 ENCOUNTER — OUTPATIENT (OUTPATIENT)
Dept: OUTPATIENT SERVICES | Facility: HOSPITAL | Age: 49
LOS: 1 days | End: 2019-04-04
Payer: COMMERCIAL

## 2019-04-04 VITALS
OXYGEN SATURATION: 100 % | TEMPERATURE: 98 F | HEIGHT: 65 IN | RESPIRATION RATE: 16 BRPM | DIASTOLIC BLOOD PRESSURE: 76 MMHG | SYSTOLIC BLOOD PRESSURE: 114 MMHG | WEIGHT: 130.07 LBS | HEART RATE: 86 BPM

## 2019-04-04 DIAGNOSIS — Z98.890 OTHER SPECIFIED POSTPROCEDURAL STATES: Chronic | ICD-10-CM

## 2019-04-04 DIAGNOSIS — T85.43XA LEAKAGE OF BREAST PROSTHESIS AND IMPLANT, INITIAL ENCOUNTER: ICD-10-CM

## 2019-04-04 DIAGNOSIS — Z01.818 ENCOUNTER FOR OTHER PREPROCEDURAL EXAMINATION: ICD-10-CM

## 2019-04-04 DIAGNOSIS — T85.41XA BREAKDOWN (MECHANICAL) OF BREAST PROSTHESIS AND IMPLANT, INITIAL ENCOUNTER: ICD-10-CM

## 2019-04-04 LAB
ANION GAP SERPL CALC-SCNC: 10 MMOL/L — SIGNIFICANT CHANGE UP (ref 5–17)
BUN SERPL-MCNC: 19 MG/DL — SIGNIFICANT CHANGE UP (ref 7–23)
CALCIUM SERPL-MCNC: 9.3 MG/DL — SIGNIFICANT CHANGE UP (ref 8.4–10.5)
CHLORIDE SERPL-SCNC: 104 MMOL/L — SIGNIFICANT CHANGE UP (ref 96–108)
CO2 SERPL-SCNC: 28 MMOL/L — SIGNIFICANT CHANGE UP (ref 22–31)
CREAT SERPL-MCNC: 0.72 MG/DL — SIGNIFICANT CHANGE UP (ref 0.5–1.3)
GLUCOSE SERPL-MCNC: 97 MG/DL — SIGNIFICANT CHANGE UP (ref 70–99)
HCT VFR BLD CALC: 37.5 % — SIGNIFICANT CHANGE UP (ref 34.5–45)
HGB BLD-MCNC: 12.3 G/DL — SIGNIFICANT CHANGE UP (ref 11.5–15.5)
MCHC RBC-ENTMCNC: 30.3 PG — SIGNIFICANT CHANGE UP (ref 27–34)
MCHC RBC-ENTMCNC: 32.8 GM/DL — SIGNIFICANT CHANGE UP (ref 32–36)
MCV RBC AUTO: 92.4 FL — SIGNIFICANT CHANGE UP (ref 80–100)
PLATELET # BLD AUTO: 264 K/UL — SIGNIFICANT CHANGE UP (ref 150–400)
POTASSIUM SERPL-MCNC: 4.7 MMOL/L — SIGNIFICANT CHANGE UP (ref 3.5–5.3)
POTASSIUM SERPL-SCNC: 4.7 MMOL/L — SIGNIFICANT CHANGE UP (ref 3.5–5.3)
RBC # BLD: 4.06 M/UL — SIGNIFICANT CHANGE UP (ref 3.8–5.2)
RBC # FLD: 12.6 % — SIGNIFICANT CHANGE UP (ref 10.3–14.5)
SODIUM SERPL-SCNC: 142 MMOL/L — SIGNIFICANT CHANGE UP (ref 135–145)
WBC # BLD: 3.86 K/UL — SIGNIFICANT CHANGE UP (ref 3.8–10.5)
WBC # FLD AUTO: 3.86 K/UL — SIGNIFICANT CHANGE UP (ref 3.8–10.5)

## 2019-04-04 PROCEDURE — 85027 COMPLETE CBC AUTOMATED: CPT

## 2019-04-04 PROCEDURE — G0463: CPT

## 2019-04-04 PROCEDURE — 80048 BASIC METABOLIC PNL TOTAL CA: CPT

## 2019-04-04 RX ORDER — LIDOCAINE HCL 20 MG/ML
0.2 VIAL (ML) INJECTION ONCE
Qty: 0 | Refills: 0 | Status: DISCONTINUED | OUTPATIENT
Start: 2019-04-11 | End: 2019-04-26

## 2019-04-04 RX ORDER — SODIUM CHLORIDE 9 MG/ML
3 INJECTION INTRAMUSCULAR; INTRAVENOUS; SUBCUTANEOUS EVERY 8 HOURS
Qty: 0 | Refills: 0 | Status: DISCONTINUED | OUTPATIENT
Start: 2019-04-11 | End: 2019-04-26

## 2019-04-04 RX ORDER — LINACLOTIDE 145 UG/1
1 CAPSULE, GELATIN COATED ORAL
Qty: 0 | Refills: 0 | COMMUNITY

## 2019-04-04 NOTE — H&P PST ADULT - NSICDXFAMILYHX_GEN_ALL_CORE_FT
FAMILY HISTORY:  Father  Still living? No  Family history of acute myocardial infarction, Age at diagnosis: Age Unknown    Mother  Still living? Yes, Estimated age: Age Unknown  Family history of breast cancer, Age at diagnosis: Age Unknown

## 2019-04-04 NOTE — H&P PST ADULT - NSICDXPASTMEDICALHX_GEN_ALL_CORE_FT
PAST MEDICAL HISTORY:  Fibromyalgia     IBS (irritable bowel syndrome)     Wrist fracture right PAST MEDICAL HISTORY:  Fibromyalgia     IBS (irritable bowel syndrome)     Leakage of breast implant     Wrist fracture right

## 2019-04-04 NOTE — H&P PST ADULT - NSICDXPROBLEM_GEN_ALL_CORE_FT
PROBLEM DIAGNOSES  Problem: Leakage of breast implant  Assessment and Plan: planned for right removal of implant, right capsulectomy, left removal of implant, bilateral new implant insertion on 4/11/19.   PST labs send  UcG the day of procedure

## 2019-04-04 NOTE — H&P PST ADULT - NSICDXPASTSURGICALHX_GEN_ALL_CORE_FT
PAST SURGICAL HISTORY:  S/P Breast Implant (B/L) '03 most recently 2012    S/P Carpal Tunnel Release (R) '03     s/p Elective Breast Implant Exchange x3     S/P wrist surgery Right ( October 2018 ) PAST SURGICAL HISTORY:  H/O foot surgery right foot  cheilectomy and phalangeal osteotomy 1/2019    S/P Breast Implant (B/L) '03 most recently 2012    S/P Carpal Tunnel Release (R) '03     s/p Elective Breast Implant Exchange x3     S/P wrist surgery Right ( October 2018 )

## 2019-04-04 NOTE — H&P PST ADULT - HISTORY OF PRESENT ILLNESS
49 yo female c/o right foot pain secondary to hallux rigidus - scheduled for right foot  cheilectomy and phalangeal osteotomy 48 year old female with PMH of IBS, hx of breast augmentation with implant with complaints of leakage planned for right removal of implant, right capsulectomy, left removal of implant, bilateral new implant insertion on 4/11/19.

## 2019-04-04 NOTE — H&P PST ADULT - MUSCULOSKELETAL
negative detailed exam joint swelling/decreased ROM due to pain No joint pain, swelling or deformity; no limitation of movement

## 2019-04-11 ENCOUNTER — OUTPATIENT (OUTPATIENT)
Dept: OUTPATIENT SERVICES | Facility: HOSPITAL | Age: 49
LOS: 1 days | End: 2019-04-11
Payer: COMMERCIAL

## 2019-04-11 ENCOUNTER — RESULT REVIEW (OUTPATIENT)
Age: 49
End: 2019-04-11

## 2019-04-11 VITALS
HEART RATE: 80 BPM | OXYGEN SATURATION: 100 % | SYSTOLIC BLOOD PRESSURE: 95 MMHG | DIASTOLIC BLOOD PRESSURE: 64 MMHG | WEIGHT: 130.07 LBS | TEMPERATURE: 98 F | HEIGHT: 65 IN

## 2019-04-11 VITALS
HEART RATE: 67 BPM | DIASTOLIC BLOOD PRESSURE: 57 MMHG | OXYGEN SATURATION: 100 % | SYSTOLIC BLOOD PRESSURE: 105 MMHG | TEMPERATURE: 98 F | RESPIRATION RATE: 18 BRPM

## 2019-04-11 DIAGNOSIS — Z98.890 OTHER SPECIFIED POSTPROCEDURAL STATES: Chronic | ICD-10-CM

## 2019-04-11 DIAGNOSIS — T85.43XA LEAKAGE OF BREAST PROSTHESIS AND IMPLANT, INITIAL ENCOUNTER: ICD-10-CM

## 2019-04-11 DIAGNOSIS — T85.41XA BREAKDOWN (MECHANICAL) OF BREAST PROSTHESIS AND IMPLANT, INITIAL ENCOUNTER: ICD-10-CM

## 2019-04-11 DIAGNOSIS — Z01.818 ENCOUNTER FOR OTHER PREPROCEDURAL EXAMINATION: ICD-10-CM

## 2019-04-11 PROCEDURE — 88300 SURGICAL PATH GROSS: CPT | Mod: 26,59

## 2019-04-11 PROCEDURE — 19340 INSJ BREAST IMPLT SM D MAST: CPT | Mod: 50

## 2019-04-11 PROCEDURE — 88304 TISSUE EXAM BY PATHOLOGIST: CPT | Mod: 26

## 2019-04-11 PROCEDURE — 88300 SURGICAL PATH GROSS: CPT

## 2019-04-11 PROCEDURE — 19371 PERI-IMPLT CAPSLC BRST COMPL: CPT | Mod: 50

## 2019-04-11 PROCEDURE — 19371 PERI-IMPLT CAPSLC BRST COMPL: CPT | Mod: RT

## 2019-04-11 PROCEDURE — C1789: CPT

## 2019-04-11 PROCEDURE — 19330 RMVL RUPTURED BREAST IMPLANT: CPT | Mod: 59,RT

## 2019-04-11 PROCEDURE — 88304 TISSUE EXAM BY PATHOLOGIST: CPT

## 2019-04-11 PROCEDURE — 19325 BREAST AUGMENTATION W/IMPLT: CPT | Mod: 50

## 2019-04-11 RX ORDER — OXYCODONE HYDROCHLORIDE 5 MG/1
5 TABLET ORAL ONCE
Qty: 0 | Refills: 0 | Status: DISCONTINUED | OUTPATIENT
Start: 2019-04-11 | End: 2019-04-11

## 2019-04-11 RX ORDER — CHLORHEXIDINE GLUCONATE 213 G/1000ML
1 SOLUTION TOPICAL ONCE
Qty: 0 | Refills: 0 | Status: COMPLETED | OUTPATIENT
Start: 2019-04-11 | End: 2019-04-11

## 2019-04-11 RX ORDER — METOCLOPRAMIDE HCL 10 MG
10 TABLET ORAL ONCE
Qty: 0 | Refills: 0 | Status: DISCONTINUED | OUTPATIENT
Start: 2019-04-11 | End: 2019-04-26

## 2019-04-11 RX ORDER — LINACLOTIDE 145 UG/1
1 CAPSULE, GELATIN COATED ORAL
Qty: 0 | Refills: 0 | COMMUNITY

## 2019-04-11 RX ORDER — ONDANSETRON 8 MG/1
4 TABLET, FILM COATED ORAL ONCE
Qty: 0 | Refills: 0 | Status: COMPLETED | OUTPATIENT
Start: 2019-04-11 | End: 2019-04-11

## 2019-04-11 RX ORDER — CEFAZOLIN SODIUM 1 G
2000 VIAL (EA) INJECTION ONCE
Qty: 0 | Refills: 0 | Status: COMPLETED | OUTPATIENT
Start: 2019-04-11 | End: 2019-04-11

## 2019-04-11 RX ORDER — HYDROMORPHONE HYDROCHLORIDE 2 MG/ML
0.5 INJECTION INTRAMUSCULAR; INTRAVENOUS; SUBCUTANEOUS
Qty: 0 | Refills: 0 | Status: DISCONTINUED | OUTPATIENT
Start: 2019-04-11 | End: 2019-04-11

## 2019-04-11 RX ORDER — APREPITANT 80 MG/1
40 CAPSULE ORAL ONCE
Qty: 0 | Refills: 0 | Status: COMPLETED | OUTPATIENT
Start: 2019-04-11 | End: 2019-04-11

## 2019-04-11 RX ADMIN — APREPITANT 40 MILLIGRAM(S): 80 CAPSULE ORAL at 07:30

## 2019-04-11 RX ADMIN — ONDANSETRON 4 MILLIGRAM(S): 8 TABLET, FILM COATED ORAL at 13:14

## 2019-04-11 RX ADMIN — CHLORHEXIDINE GLUCONATE 1 APPLICATION(S): 213 SOLUTION TOPICAL at 07:31

## 2019-04-11 RX ADMIN — OXYCODONE HYDROCHLORIDE 5 MILLIGRAM(S): 5 TABLET ORAL at 13:14

## 2019-04-11 NOTE — ASU PATIENT PROFILE, ADULT - PSH
H/O foot surgery  right foot  cheilectomy and phalangeal osteotomy 1/2019  S/P Breast Implant (B/L) '03  most recently 2012  S/P Carpal Tunnel Release (R) '03    s/p Elective Breast Implant Exchange x3    S/P wrist surgery  Right ( October 2018 )

## 2019-04-11 NOTE — BRIEF OPERATIVE NOTE - NSICDXBRIEFPROCEDURE_GEN_ALL_CORE_FT
PROCEDURES:  Removal or replacement of breast implants or tissue expanders of both breasts with capsulotomies, capsulectomies, or capsulorrhaphies 11-Apr-2019 13:03:55  Dima Estrella

## 2019-04-11 NOTE — BRIEF OPERATIVE NOTE - SPECIMENS
right ruptured breast implant, left ruptured breast implant, right breast capsule, left breast capsule

## 2019-04-11 NOTE — ASU DISCHARGE PLAN (ADULT/PEDIATRIC) - DISCHARGE PLAN IS COMPLETE AND GIVEN TO PATIENT
2017      RE: Alex Elena II  [address] [facility] [address]       17    Yusra Hare PA-C   [facility]    [address] [place] [address]       Dear Ms. Hare:      I had the pleasure of seeing Alex back in the Pediatric Otolaryngology Clinic at the [facility].        HISTORY OF PRESENT ILLNESS:  He is otherwise healthy 12-month-old. I saw him approximately 1 year ago. He has a right preauricular lesion. He does pull at it. It occasionally becomes red. Parents feel that it bothers him. They have never  needed antibiotics..  Passed his  hearing screen.  No difficulties breathing from his nose.  No cardiac history.  No family history of deafness.         PAST MEDICAL HISTORY:  Born full-term.  Passed  hearing screen.      SOCIAL HISTORY:  Lives at home with parents.  No smoke exposure.  No .      FAMILY HISTORY:  No bleeding or clotting problems.  No difficulties with anesthesia.  No hearing loss.      PHYSICAL EXAMINATION:   GENERAL:  Alex is a 3-week-old in no acute distress.   VITAL SIGNS:  Reviewed.   HEENT:  Normocephalic, atraumatic.  Bilateral ears are well formed and in appropriate position.  Left preauricular region has a pedunculated skin tag with a cartilaginous remnant. Slight erythema. Nontender on today's exam..  Ear canals are patent.  Minimal amount of cerumen.  Tympanic membranes are intact.  No signs of middle ear effusion.  Nose is symmetric.  Septum is midline.  Turbinates are non-edematous and non-obstructive.  Oral cavity:  Lips are pink and well formed.  No oral cavity or oropharyngeal lesions.      NECK:  Supple, full range of motion.   NEUROLOGIC:  Cranial nerves are grossly intact.      IMPRESSION AND PLAN:  Alex is a [id number] boy with a right preauricular lesion with a cartilaginous remnant. At this point. It does bother Alex. Parents are hoping to avoid surgery at his age. If  it does continue to bother him significantly or continues to become red and painful. Would recommend surgical excision. Otherwise, can continue with conservative management. Family will contact us in the future if it continues to be painful and erythematous.     Sincerely,          Ricco Cooley MD   Pediatric Otolaryngology and Facial Plastics   Department of Otolaryngology   [facility] [phone]   Pager [phone]   [email or web address]       MERARI/[email or web address]        : Yes

## 2019-04-11 NOTE — ASU DISCHARGE PLAN (ADULT/PEDIATRIC) - CARE PROVIDER_API CALL
Bud Fernandez)  Plastic Surgery  [address] [place] [address], [address]  [place] [address]  Phone: [phone]  Fax: [phone]  Follow Up Time:

## 2019-04-11 NOTE — BRIEF OPERATIVE NOTE - OPERATION/FINDINGS
Removal of bilateral ruptured implants. Partial capsulectomies performed. Creation of submuscular pocket. Insertion of 560 mL smooth round silicone implants bilaterally.

## 2019-04-11 NOTE — ASU DISCHARGE PLAN (ADULT/PEDIATRIC) - ASU DC SPECIAL INSTRUCTIONSFT
Please keep dressing on dry and intact.    Wear surgical bra.    Keep elbows below shoulders. No heavy lifting.    Sponge bathe only.    Take medications as prescribed from Dr. Fernandez's office.    Please follow up with Dr. Fernandez within x1 week after discharge from the hospital. You may call [phone] to schedule an appointment.

## 2019-04-11 NOTE — ASU DISCHARGE PLAN (ADULT/PEDIATRIC) - CALL YOUR DOCTOR IF YOU HAVE ANY OF THE FOLLOWING:
Pain not relieved by Medications/Bleeding that does not stop/Fever greater than (need to indicate Fahrenheit or Celsius)/Wound/Surgical Site with redness, or foul smelling discharge or pus/Swelling that gets worse

## 2019-04-23 LAB — SURGICAL PATHOLOGY STUDY: SIGNIFICANT CHANGE UP

## 2019-08-07 ENCOUNTER — TRANSCRIPTION ENCOUNTER (OUTPATIENT)
Age: 49
End: 2019-08-07

## 2019-10-30 ENCOUNTER — CHART COPY (OUTPATIENT)
Age: 49
End: 2019-10-30

## 2019-12-02 ENCOUNTER — RX RENEWAL (OUTPATIENT)
Age: 49
End: 2019-12-02

## 2019-12-13 ENCOUNTER — OTHER (OUTPATIENT)
Age: 49
End: 2019-12-13

## 2020-01-09 NOTE — PACU DISCHARGE NOTE - NS MD DISCHARGE NOTE DISCHARGE
Noted, will look for documentation after visit next week
Patient wanted Florinda Garrison 
Home

## 2020-01-22 ENCOUNTER — OTHER (OUTPATIENT)
Age: 50
End: 2020-01-22

## 2020-02-27 PROBLEM — T85.43XA LEAKAGE OF BREAST PROSTHESIS AND IMPLANT, INITIAL ENCOUNTER: Chronic | Status: ACTIVE | Noted: 2019-04-04

## 2020-03-05 ENCOUNTER — FORM ENCOUNTER (OUTPATIENT)
Age: 50
End: 2020-03-05

## 2020-03-06 ENCOUNTER — APPOINTMENT (OUTPATIENT)
Dept: MRI IMAGING | Facility: CLINIC | Age: 50
End: 2020-03-06
Payer: COMMERCIAL

## 2020-03-06 ENCOUNTER — OUTPATIENT (OUTPATIENT)
Dept: OUTPATIENT SERVICES | Facility: HOSPITAL | Age: 50
LOS: 1 days | End: 2020-03-06
Payer: COMMERCIAL

## 2020-03-06 DIAGNOSIS — Z98.890 OTHER SPECIFIED POSTPROCEDURAL STATES: Chronic | ICD-10-CM

## 2020-03-06 DIAGNOSIS — Z00.8 ENCOUNTER FOR OTHER GENERAL EXAMINATION: ICD-10-CM

## 2020-03-06 PROCEDURE — C8937: CPT

## 2020-03-06 PROCEDURE — 77049 MRI BREAST C-+ W/CAD BI: CPT | Mod: 26

## 2020-03-06 PROCEDURE — C8908: CPT

## 2020-03-06 PROCEDURE — A9585: CPT

## 2020-03-26 ENCOUNTER — APPOINTMENT (OUTPATIENT)
Dept: RADIOLOGY | Facility: IMAGING CENTER | Age: 50
End: 2020-03-26
Payer: COMMERCIAL

## 2020-03-26 ENCOUNTER — OUTPATIENT (OUTPATIENT)
Dept: OUTPATIENT SERVICES | Facility: HOSPITAL | Age: 50
LOS: 1 days | End: 2020-03-26
Payer: COMMERCIAL

## 2020-03-26 DIAGNOSIS — Z00.8 ENCOUNTER FOR OTHER GENERAL EXAMINATION: ICD-10-CM

## 2020-03-26 DIAGNOSIS — Z98.890 OTHER SPECIFIED POSTPROCEDURAL STATES: Chronic | ICD-10-CM

## 2020-03-26 PROCEDURE — 71046 X-RAY EXAM CHEST 2 VIEWS: CPT | Mod: 26

## 2020-03-26 PROCEDURE — 71046 X-RAY EXAM CHEST 2 VIEWS: CPT

## 2020-04-25 ENCOUNTER — MESSAGE (OUTPATIENT)
Age: 50
End: 2020-04-25

## 2020-04-30 LAB
SARS-COV-2 IGG SERPL IA-ACNC: 0 INDEX
SARS-COV-2 IGG SERPL QL IA: NEGATIVE

## 2020-06-17 ENCOUNTER — RESULT REVIEW (OUTPATIENT)
Age: 50
End: 2020-06-17

## 2020-07-07 ENCOUNTER — APPOINTMENT (OUTPATIENT)
Dept: DISASTER EMERGENCY | Facility: CLINIC | Age: 50
End: 2020-07-07

## 2020-07-08 ENCOUNTER — LABORATORY RESULT (OUTPATIENT)
Age: 50
End: 2020-07-08

## 2020-07-08 LAB — SARS-COV-2 N GENE NPH QL NAA+PROBE: NOT DETECTED

## 2020-11-24 ENCOUNTER — APPOINTMENT (OUTPATIENT)
Dept: OPHTHALMOLOGY | Facility: CLINIC | Age: 50
End: 2020-11-24
Payer: COMMERCIAL

## 2020-11-24 ENCOUNTER — NON-APPOINTMENT (OUTPATIENT)
Age: 50
End: 2020-11-24

## 2020-11-24 PROCEDURE — 92004 COMPRE OPH EXAM NEW PT 1/>: CPT

## 2020-11-24 PROCEDURE — 92133 CPTRZD OPH DX IMG PST SGM ON: CPT

## 2020-11-24 PROCEDURE — 76514 ECHO EXAM OF EYE THICKNESS: CPT

## 2020-12-28 ENCOUNTER — APPOINTMENT (OUTPATIENT)
Dept: SURGERY | Facility: CLINIC | Age: 50
End: 2020-12-28

## 2021-02-16 ENCOUNTER — NON-APPOINTMENT (OUTPATIENT)
Age: 51
End: 2021-02-16

## 2021-04-10 ENCOUNTER — NON-APPOINTMENT (OUTPATIENT)
Age: 51
End: 2021-04-10

## 2021-04-16 LAB
COVID-19 NUCLEOCAPSID  GAM ANTIBODY INTERPRETATION: NEGATIVE
COVID-19 SPIKE DOMAIN ANTIBODY INTERPRETATION: POSITIVE
SARS-COV-2 AB SERPL IA-ACNC: >250 U/ML
SARS-COV-2 AB SERPL QL IA: 0.08 INDEX

## 2021-05-10 ENCOUNTER — APPOINTMENT (OUTPATIENT)
Dept: OPHTHALMOLOGY | Facility: CLINIC | Age: 51
End: 2021-05-10
Payer: COMMERCIAL

## 2021-05-10 ENCOUNTER — NON-APPOINTMENT (OUTPATIENT)
Age: 51
End: 2021-05-10

## 2021-05-10 PROCEDURE — 99072 ADDL SUPL MATRL&STAF TM PHE: CPT

## 2021-05-10 PROCEDURE — 92012 INTRM OPH EXAM EST PATIENT: CPT

## 2021-06-14 ENCOUNTER — NON-APPOINTMENT (OUTPATIENT)
Age: 51
End: 2021-06-14

## 2021-07-05 ENCOUNTER — APPOINTMENT (OUTPATIENT)
Dept: MRI IMAGING | Facility: IMAGING CENTER | Age: 51
End: 2021-07-05
Payer: COMMERCIAL

## 2021-07-05 ENCOUNTER — RESULT REVIEW (OUTPATIENT)
Age: 51
End: 2021-07-05

## 2021-07-05 ENCOUNTER — OUTPATIENT (OUTPATIENT)
Dept: OUTPATIENT SERVICES | Facility: HOSPITAL | Age: 51
LOS: 1 days | End: 2021-07-05
Payer: COMMERCIAL

## 2021-07-05 DIAGNOSIS — Z98.890 OTHER SPECIFIED POSTPROCEDURAL STATES: Chronic | ICD-10-CM

## 2021-07-05 DIAGNOSIS — Z00.00 ENCOUNTER FOR GENERAL ADULT MEDICAL EXAMINATION WITHOUT ABNORMAL FINDINGS: ICD-10-CM

## 2021-07-05 PROCEDURE — A9585: CPT

## 2021-07-05 PROCEDURE — C8937: CPT

## 2021-07-05 PROCEDURE — 77049 MRI BREAST C-+ W/CAD BI: CPT | Mod: 26

## 2021-07-05 PROCEDURE — C8908: CPT

## 2021-07-07 ENCOUNTER — NON-APPOINTMENT (OUTPATIENT)
Age: 51
End: 2021-07-07

## 2021-08-19 ENCOUNTER — NON-APPOINTMENT (OUTPATIENT)
Age: 51
End: 2021-08-19

## 2021-11-08 ENCOUNTER — APPOINTMENT (OUTPATIENT)
Dept: OPHTHALMOLOGY | Facility: CLINIC | Age: 51
End: 2021-11-08

## 2021-11-08 ENCOUNTER — NON-APPOINTMENT (OUTPATIENT)
Age: 51
End: 2021-11-08

## 2021-12-02 ENCOUNTER — TRANSCRIPTION ENCOUNTER (OUTPATIENT)
Age: 51
End: 2021-12-02

## 2021-12-05 ENCOUNTER — FORM ENCOUNTER (OUTPATIENT)
Age: 51
End: 2021-12-05

## 2021-12-06 ENCOUNTER — TRANSCRIPTION ENCOUNTER (OUTPATIENT)
Age: 51
End: 2021-12-06

## 2021-12-07 ENCOUNTER — EMERGENCY (EMERGENCY)
Facility: HOSPITAL | Age: 51
LOS: 0 days | Discharge: HOME | End: 2021-12-07
Payer: COMMERCIAL

## 2021-12-07 VITALS — HEIGHT: 65 IN | WEIGHT: 134.92 LBS

## 2021-12-07 VITALS
RESPIRATION RATE: 18 BRPM | DIASTOLIC BLOOD PRESSURE: 57 MMHG | OXYGEN SATURATION: 100 % | TEMPERATURE: 98 F | HEART RATE: 73 BPM | SYSTOLIC BLOOD PRESSURE: 122 MMHG

## 2021-12-07 DIAGNOSIS — Z98.890 OTHER SPECIFIED POSTPROCEDURAL STATES: Chronic | ICD-10-CM

## 2021-12-07 DIAGNOSIS — U07.1 COVID-19: ICD-10-CM

## 2021-12-07 DIAGNOSIS — Z88.2 ALLERGY STATUS TO SULFONAMIDES: ICD-10-CM

## 2021-12-07 PROCEDURE — L9998: CPT

## 2021-12-07 PROCEDURE — M0243: CPT

## 2021-12-07 RX ORDER — SODIUM CHLORIDE 9 MG/ML
250 INJECTION INTRAMUSCULAR; INTRAVENOUS; SUBCUTANEOUS
Refills: 0 | Status: COMPLETED | OUTPATIENT
Start: 2021-12-07 | End: 2021-12-07

## 2021-12-07 RX ADMIN — SODIUM CHLORIDE 310 MILLILITER(S): 9 INJECTION INTRAMUSCULAR; INTRAVENOUS; SUBCUTANEOUS at 09:34

## 2021-12-07 NOTE — ED STATDOCS - NSICDXPASTSURGICALHX_GEN_ALL_CORE_FT
PAST SURGICAL HISTORY:  H/O foot surgery right foot  cheilectomy and phalangeal osteotomy 1/2019    S/P Breast Implant (B/L) '03 most recently 2012    S/P Carpal Tunnel Release (R) '03     s/p Elective Breast Implant Exchange x3     S/P wrist surgery Right ( October 2018 )

## 2021-12-07 NOTE — ED ADULT TRIAGE NOTE - NSSEPSISSUSPECTED_ED_A_ED
"  Preventive Care at the 4 Month Visit  Growth Measurements & Percentiles  Head Circumference: 15.95\" (40.5 cm) (28 %, Source: WHO (Girls, 0-2 years)) 28 %ile based on WHO (Girls, 0-2 years) head circumference-for-age data using vitals from 2018.   Weight: 12 lbs 15 oz / 5.87 kg (actual weight) 12 %ile based on WHO (Girls, 0-2 years) weight-for-age data using vitals from 2018.   Length: 2' 1.5\" / 64.8 cm 71 %ile based on WHO (Girls, 0-2 years) length-for-age data using vitals from 2018.   Weight for length: 2 %ile based on WHO (Girls, 0-2 years) weight-for-recumbent length data using vitals from 2018.    Your baby s next Preventive Check-up will be at 6 months of age      Development    At this age, your baby may:    Raise her head high when lying on her stomach.    Raise her body on her hands when lying on her stomach.    Roll from her stomach to her back.    Play with her hands and hold a rattle.    Look at a mobile and move her hands.    Start social contact by smiling, cooing, laughing and squealing.    Cry when a parent moves out of sight.    Understand when a bottle is being prepared or getting ready to breastfeed and be able to wait for it for a short time.      Feeding Tips  Breast Milk    Nurse on demand     Check out the handout on Employed Breastfeeding Mother. https://www.lactationtraining.com/resources/educational-materials/handouts-parents/employed-breastfeeding-mother/download    Formula     Many babies feed 4 to 6 times per day, 6 to 8 oz at each feeding.    Don't prop the bottle.      Use a pacifier if the baby wants to suck.      Foods    It is often between 4-6 months that your baby will start watching you eat intently and then mouthing or grabbing for food. Follow her cues to start and stop eating.  Many people start by mixing rice cereal with breast milk or formula. Do not put cereal into a bottle.    To reduce your child's chance of developing peanut allergy, you can start " introducing peanut-containing foods in small amounts around 6 months of age.  If your child has severe eczema, egg allergy or both, consult with your doctor first about possible allergy-testing and introduction of small amounts of peanut-containing foods at 4-6 months old.   Stools    If you give your baby pureéd foods, her stools may be less firm, occur less often, have a strong odor or become a different color.      Sleep    About 80 percent of 4-month-old babies sleep at least five to six hours in a row at night.  If your baby doesn t, try putting her to bed while drowsy/tired but awake.  Give your baby the same safe toy or blanket.  This is called a  transition object.   Do not play with or have a lot of contact with your baby at nighttime.    Your baby does not need to be fed if she wakes up during the night more frequently than every 5-6 hours.        Safety    The car seat should be in the rear seat facing backwards until your child weighs more than 20 pounds and turns 2 years old.    Do not let anyone smoke around your baby (or in your house or car) at any time.    Never leave your baby alone, even for a few seconds.  Your baby may be able to roll over.  Take any safety precautions.    Keep baby powders,  and small objects out of the baby s reach at all times.    Do not use infant walkers.  They can cause serious accidents and serve no useful purpose.  A better choice is an stationary exersaucer.      What Your Baby Needs    Give your baby toys that she can shake or bang.  A toy that makes noise as it s moved increases your baby s awareness.  She will repeat that activity.    Sing rhythmic songs or nursery rhymes.    Your baby may drool a lot or put objects into her mouth.  Make sure your baby is safe from small or sharp objects.    Read to your baby every night.           No

## 2021-12-07 NOTE — ED STATDOCS - PATIENT PORTAL LINK FT
You can access the FollowMyHealth Patient Portal offered by [facility] by registering at the following website: [email or web address]. By joining [employer]’s FollowMyHealth portal, you will also be able to view your health information using other applications (apps) compatible with our system.

## 2021-12-07 NOTE — ED STATDOCS - OBJECTIVE STATEMENT
51F here for MAB. pt was dx with COVID-19 on 12/2, developed symptoms on 11/28. Denies CP/SOB. Pt had pfizer vaccine second dose 1/29/21.

## 2021-12-07 NOTE — ED ADULT TRIAGE NOTE - CHIEF COMPLAINT QUOTE
Pt. presents to ED for monoclonal antibody infusion. Tested positive for covid on thursday 12/2.  Pfizer last dose 1/29/21

## 2021-12-07 NOTE — ED STATDOCS - CLINICAL SUMMARY MEDICAL DECISION MAKING FREE TEXT BOX
Patient has history of COVID-19 symptoms for [  ] days    Patient has high risk factors that include:  [  ] Chronic Kidney Disease [  ] Diabetes Mellitus [  ] Immune Suppressive [  ] Receiving Immunosuppressive Treatment [  ] Overweight/Obesity BMI greater than 25 [  ] Age greater than or equal 65 years [  ] Cardiovascular Disease [  ] HTN [  ] COPD/Other Chronic Respiratory Disease [  ] Sickle Cell Disease [  ] Congenital/Acquired Heart Disease [  ] Neurodevelopmental Disorder [  ] Medical related technology dependence [  ] Asthma/Chronic Respiratory Disease [  ] Immune Suppressive Disease [  ] Receiving Immune Suppressive Therapy [  ] Other:     Patient received prior COVID treatment that included [  ]  Patient provided explanation of monoclonal antibody infusion and is in agreement with treatment plan. See consent form in medical record.    [  ] Patient received monoclonal antibody infusion with no adverse reaction. Patient planned for discharge home and follow up with outpatient [facility] program.    [  ] Patient received monoclonal antibody infusion and experienced an adverse reaction. Symptoms include [  ]. Patient treated with [  ]. Patient planned for [  ] Patient has history of COVID-19 symptoms for [ 9 ] days    Patient has high risk factors that include:  [  ] Chronic Kidney Disease [  ] Diabetes Mellitus [  ] Immune Suppressive [  ] Receiving Immunosuppressive Treatment [  ] Overweight/Obesity BMI greater than 25 [  ] Age greater than or equal 65 years [  ] Cardiovascular Disease [  ] HTN [  ] COPD/Other Chronic Respiratory Disease [  ] Sickle Cell Disease [  ] Congenital/Acquired Heart Disease [  ] Neurodevelopmental Disorder [  ] Medical related technology dependence [  ] Asthma/Chronic Respiratory Disease [  ] Immune Suppressive Disease [  ] Receiving Immune Suppressive Therapy [  ] Other: autoimmune disease    Patient received prior COVID treatment that included pfizer vaccine    [ x ]Patient provided explanation of monoclonal antibody infusion and is in agreement with treatment plan. See consent form in medical record.    [ x ] Patient received monoclonal antibody infusion with no adverse reaction. Patient planned for discharge home and follow up with outpatient [facility] program.    [  ] Patient received monoclonal antibody infusion and experienced an adverse reaction. Symptoms include [  ]. Patient treated with [  ]. Patient planned for [  ]

## 2021-12-07 NOTE — ED STATDOCS - NSICDXPASTMEDICALHX_GEN_ALL_CORE_FT
PAST MEDICAL HISTORY:  Fibromyalgia     IBS (irritable bowel syndrome)     Leakage of breast implant     Wrist fracture right

## 2021-12-08 ENCOUNTER — TRANSCRIPTION ENCOUNTER (OUTPATIENT)
Age: 51
End: 2021-12-08

## 2021-12-09 ENCOUNTER — TRANSCRIPTION ENCOUNTER (OUTPATIENT)
Age: 51
End: 2021-12-09

## 2021-12-14 ENCOUNTER — TRANSCRIPTION ENCOUNTER (OUTPATIENT)
Age: 51
End: 2021-12-14

## 2021-12-25 ENCOUNTER — RX RENEWAL (OUTPATIENT)
Age: 51
End: 2021-12-25

## 2022-01-12 ENCOUNTER — NON-APPOINTMENT (OUTPATIENT)
Age: 52
End: 2022-01-12

## 2022-01-12 RX ORDER — AMOXICILLIN AND CLAVULANATE POTASSIUM 875; 125 MG/1; MG/1
875-125 TABLET, COATED ORAL
Qty: 20 | Refills: 3 | Status: ACTIVE | COMMUNITY
Start: 2022-01-12 | End: 1900-01-01

## 2022-01-18 ENCOUNTER — APPOINTMENT (OUTPATIENT)
Dept: INTERNAL MEDICINE | Facility: CLINIC | Age: 52
End: 2022-01-18
Payer: COMMERCIAL

## 2022-01-18 ENCOUNTER — NON-APPOINTMENT (OUTPATIENT)
Age: 52
End: 2022-01-18

## 2022-01-18 ENCOUNTER — LABORATORY RESULT (OUTPATIENT)
Age: 52
End: 2022-01-18

## 2022-01-18 VITALS
TEMPERATURE: 98 F | DIASTOLIC BLOOD PRESSURE: 76 MMHG | BODY MASS INDEX: 25.39 KG/M2 | WEIGHT: 158 LBS | SYSTOLIC BLOOD PRESSURE: 116 MMHG | HEIGHT: 66 IN

## 2022-01-18 DIAGNOSIS — J01.90 ACUTE SINUSITIS, UNSPECIFIED: ICD-10-CM

## 2022-01-18 DIAGNOSIS — M79.7 FIBROMYALGIA: ICD-10-CM

## 2022-01-18 DIAGNOSIS — Z00.00 ENCOUNTER FOR GENERAL ADULT MEDICAL EXAMINATION W/OUT ABNORMAL FINDINGS: ICD-10-CM

## 2022-01-18 DIAGNOSIS — B96.89 ACUTE SINUSITIS, UNSPECIFIED: ICD-10-CM

## 2022-01-18 DIAGNOSIS — E27.0 OTHER ADRENOCORTICAL OVERACTIVITY: ICD-10-CM

## 2022-01-18 DIAGNOSIS — Z98.890 OTHER SPECIFIED POSTPROCEDURAL STATES: ICD-10-CM

## 2022-01-18 PROCEDURE — 36415 COLL VENOUS BLD VENIPUNCTURE: CPT

## 2022-01-18 PROCEDURE — 99386 PREV VISIT NEW AGE 40-64: CPT | Mod: 25

## 2022-01-18 RX ORDER — LINACLOTIDE 290 UG/1
290 CAPSULE, GELATIN COATED ORAL
Qty: 90 | Refills: 2 | Status: DISCONTINUED | COMMUNITY
Start: 2020-06-17 | End: 2022-01-18

## 2022-01-18 RX ORDER — SULFAMETHOXAZOLE AND TRIMETHOPRIM 800; 160 MG/1; MG/1
800-160 TABLET ORAL TWICE DAILY
Qty: 20 | Refills: 2 | Status: DISCONTINUED | COMMUNITY
Start: 2018-09-12 | End: 2022-01-18

## 2022-01-18 RX ORDER — AMOXICILLIN AND CLAVULANATE POTASSIUM 875; 125 MG/1; MG/1
875-125 TABLET, COATED ORAL
Qty: 20 | Refills: 1 | Status: DISCONTINUED | COMMUNITY
Start: 2019-08-01 | End: 2022-01-18

## 2022-01-18 RX ORDER — AMOXICILLIN AND CLAVULANATE POTASSIUM 875; 125 MG/1; MG/1
875-125 TABLET, COATED ORAL TWICE DAILY
Qty: 14 | Refills: 1 | Status: DISCONTINUED | COMMUNITY
Start: 2020-01-22 | End: 2022-01-18

## 2022-01-18 RX ORDER — LINACLOTIDE 290 UG/1
290 CAPSULE, GELATIN COATED ORAL
Qty: 90 | Refills: 2 | Status: DISCONTINUED | COMMUNITY
Start: 2021-04-23 | End: 2022-01-18

## 2022-01-18 RX ORDER — LINACLOTIDE 290 UG/1
290 CAPSULE, GELATIN COATED ORAL TWICE DAILY
Qty: 180 | Refills: 3 | Status: DISCONTINUED | COMMUNITY
Start: 2021-04-10 | End: 2022-01-18

## 2022-01-18 RX ORDER — CEPHALEXIN 500 MG/1
500 CAPSULE ORAL
Qty: 28 | Refills: 3 | Status: DISCONTINUED | COMMUNITY
Start: 2020-08-17 | End: 2022-01-18

## 2022-01-18 RX ORDER — AMOXICILLIN AND CLAVULANATE POTASSIUM 875; 125 MG/1; MG/1
875-125 TABLET, COATED ORAL
Qty: 20 | Refills: 1 | Status: DISCONTINUED | COMMUNITY
Start: 2019-12-13 | End: 2022-01-18

## 2022-01-18 RX ORDER — AMOXICILLIN AND CLAVULANATE POTASSIUM 875; 125 MG/1; MG/1
875-125 TABLET, COATED ORAL
Qty: 20 | Refills: 1 | Status: DISCONTINUED | COMMUNITY
Start: 2019-09-13 | End: 2022-01-18

## 2022-01-18 RX ORDER — METHYLPREDNISOLONE 4 MG/1
4 TABLET ORAL
Qty: 1 | Refills: 0 | Status: DISCONTINUED | COMMUNITY
Start: 2018-09-20 | End: 2022-01-18

## 2022-01-18 RX ORDER — AMOXICILLIN AND CLAVULANATE POTASSIUM 875; 125 MG/1; MG/1
875-125 TABLET, COATED ORAL
Qty: 20 | Refills: 1 | Status: DISCONTINUED | COMMUNITY
Start: 2018-03-22 | End: 2022-01-18

## 2022-01-18 RX ORDER — CEPHALEXIN 500 MG/1
500 CAPSULE ORAL 4 TIMES DAILY
Qty: 28 | Refills: 3 | Status: DISCONTINUED | COMMUNITY
Start: 2020-02-28 | End: 2022-01-18

## 2022-01-18 RX ORDER — AMOXICILLIN AND CLAVULANATE POTASSIUM 875; 125 MG/1; MG/1
875-125 TABLET, COATED ORAL TWICE DAILY
Qty: 20 | Refills: 0 | Status: DISCONTINUED | COMMUNITY
Start: 2019-03-05 | End: 2022-01-18

## 2022-01-18 RX ORDER — MONTELUKAST 10 MG/1
10 TABLET, FILM COATED ORAL
Qty: 1 | Refills: 3 | Status: ACTIVE | COMMUNITY
Start: 2022-01-18 | End: 1900-01-01

## 2022-01-18 RX ORDER — OSELTAMIVIR PHOSPHATE 75 MG/1
75 CAPSULE ORAL TWICE DAILY
Qty: 10 | Refills: 1 | Status: DISCONTINUED | COMMUNITY
Start: 2018-12-27 | End: 2022-01-18

## 2022-01-18 RX ORDER — AMOXICILLIN AND CLAVULANATE POTASSIUM 875; 125 MG/1; MG/1
875-125 TABLET, COATED ORAL
Qty: 14 | Refills: 3 | Status: DISCONTINUED | COMMUNITY
Start: 2018-10-24 | End: 2022-01-18

## 2022-01-18 RX ORDER — CEFPODOXIME PROXETIL 200 MG/1
200 TABLET, FILM COATED ORAL TWICE DAILY
Qty: 20 | Refills: 1 | Status: ACTIVE | COMMUNITY
Start: 2022-01-18 | End: 1900-01-01

## 2022-01-18 RX ORDER — AMOXICILLIN AND CLAVULANATE POTASSIUM 875; 125 MG/1; MG/1
875-125 TABLET, COATED ORAL
Qty: 20 | Refills: 4 | Status: DISCONTINUED | COMMUNITY
Start: 2017-09-26 | End: 2022-01-18

## 2022-01-18 RX ORDER — AMOXICILLIN AND CLAVULANATE POTASSIUM 875; 125 MG/1; MG/1
875-125 TABLET, COATED ORAL
Qty: 20 | Refills: 3 | Status: DISCONTINUED | COMMUNITY
Start: 2021-11-08 | End: 2022-01-18

## 2022-01-18 RX ORDER — CEPHALEXIN 500 MG/1
500 CAPSULE ORAL 4 TIMES DAILY
Qty: 28 | Refills: 3 | Status: DISCONTINUED | COMMUNITY
Start: 2020-12-17 | End: 2022-01-18

## 2022-01-18 RX ORDER — BENZONATATE 100 MG/1
100 CAPSULE ORAL
Qty: 30 | Refills: 1 | Status: DISCONTINUED | COMMUNITY
Start: 2020-03-26 | End: 2022-01-18

## 2022-01-18 RX ORDER — DOXYCYCLINE HYCLATE 100 MG/1
100 CAPSULE ORAL
Qty: 2 | Refills: 3 | Status: DISCONTINUED | COMMUNITY
Start: 2018-04-05 | End: 2022-01-18

## 2022-01-18 RX ORDER — AMOXICILLIN AND CLAVULANATE POTASSIUM 875; 125 MG/1; MG/1
875-125 TABLET, COATED ORAL
Qty: 20 | Refills: 1 | Status: DISCONTINUED | COMMUNITY
Start: 2020-03-24 | End: 2022-01-18

## 2022-01-18 RX ORDER — CEPHALEXIN 500 MG/1
500 CAPSULE ORAL 4 TIMES DAILY
Qty: 28 | Refills: 3 | Status: DISCONTINUED | COMMUNITY
Start: 2017-02-15 | End: 2022-01-18

## 2022-01-18 RX ORDER — SULFAMETHOXAZOLE AND TRIMETHOPRIM 800; 160 MG/1; MG/1
800-160 TABLET ORAL TWICE DAILY
Qty: 20 | Refills: 2 | Status: DISCONTINUED | COMMUNITY
Start: 2020-03-04 | End: 2022-01-18

## 2022-01-18 RX ORDER — AMOXICILLIN AND CLAVULANATE POTASSIUM 875; 125 MG/1; MG/1
875-125 TABLET, COATED ORAL
Qty: 14 | Refills: 1 | Status: DISCONTINUED | COMMUNITY
Start: 2019-02-06 | End: 2022-01-18

## 2022-01-18 RX ORDER — AMOXICILLIN AND CLAVULANATE POTASSIUM 875; 125 MG/1; MG/1
875-125 TABLET, COATED ORAL
Qty: 20 | Refills: 1 | Status: DISCONTINUED | COMMUNITY
Start: 2019-10-11 | End: 2022-01-18

## 2022-01-18 RX ORDER — AMOXICILLIN AND CLAVULANATE POTASSIUM 875; 125 MG/1; MG/1
875-125 TABLET, COATED ORAL
Qty: 20 | Refills: 1 | Status: DISCONTINUED | COMMUNITY
Start: 2021-08-19 | End: 2022-01-18

## 2022-01-18 RX ORDER — LINACLOTIDE 290 UG/1
290 CAPSULE, GELATIN COATED ORAL TWICE DAILY
Qty: 90 | Refills: 2 | Status: DISCONTINUED | COMMUNITY
Start: 2018-08-14 | End: 2022-01-18

## 2022-01-18 RX ORDER — AMOXICILLIN AND CLAVULANATE POTASSIUM 875; 125 MG/1; MG/1
875-125 TABLET, COATED ORAL
Qty: 20 | Refills: 1 | Status: DISCONTINUED | COMMUNITY
Start: 2021-06-14 | End: 2022-01-18

## 2022-01-18 RX ORDER — DOXYCYCLINE HYCLATE 100 MG/1
100 CAPSULE ORAL
Qty: 2 | Refills: 3 | Status: DISCONTINUED | COMMUNITY
Start: 2017-06-19 | End: 2022-01-18

## 2022-01-18 NOTE — HISTORY OF PRESENT ILLNESS
[FreeTextEntry1] : Annual exam [de-identified] : Annual\par had 2 covid shot\par dec 2 covid mab did well except sinus\par fibromyalgia worse post covid\par brain fog post covid\par menopausal 4 years\par \par \par still with cough and post nasal drip\par chronic sinus issue use flonase every day\par Saw Dr Yates want to do office procedure\par \par ibs/ constipation on linzess\par \par Last colon 4 year ago\par cesario Vaughn\par

## 2022-01-18 NOTE — HEALTH RISK ASSESSMENT
[Very Good] : ~his/her~  mood as very good [Yes] : Yes [2 - 4 times a month (2 pts)] : 2-4 times a month (2 points) [Never (0 pts)] : Never (0 points) [No falls in past year] : Patient reported no falls in the past year [0] : 2) Feeling down, depressed, or hopeless: Not at all (0) [PHQ-2 Negative - No further assessment needed] : PHQ-2 Negative - No further assessment needed [None] : None [Alone] : lives alone [Employed] : employed [College] : College [] :  [Feels Safe at Home] : Feels safe at home [Fully functional (bathing, dressing, toileting, transferring, walking, feeding)] : Fully functional (bathing, dressing, toileting, transferring, walking, feeding) [Fully functional (using the telephone, shopping, preparing meals, housekeeping, doing laundry, using] : Fully functional and needs no help or supervision to perform IADLs (using the telephone, shopping, preparing meals, housekeeping, doing laundry, using transportation, managing medications and managing finances) [Smoke Detector] : smoke detector [Carbon Monoxide Detector] : carbon monoxide detector [Seat Belt] :  uses seat belt [OAA5Ogdcy] : 0 [Change in mental status noted] : No change in mental status noted [Language] : denies difficulty with language [Behavior] : denies difficulty with behavior [Learning/Retaining New Information] : denies difficulty learning/retaining new information [Handling Complex Tasks] : denies difficulty handling complex tasks [Reasoning] : denies difficulty with reasoning [Spatial Ability and Orientation] : denies difficulty with spatial ability and orientation [Reports changes in hearing] : Reports no changes in hearing [Reports changes in vision] : Reports no changes in vision [Guns at Home] : no guns at home

## 2022-01-18 NOTE — PLAN
[FreeTextEntry1] : Establish care\par had flu shot\par 2 covid shot\par had covid dec with MAB\par \par Recurrent sinusitis with normal looking passsages?\par singulair\par cefpod\par afrin 3 days\par sinus ct\par eventual ent/ possible allergist\par fibromyalgia\par ibs\par continue flonase

## 2022-01-18 NOTE — PHYSICAL EXAM
[Normal] : no acute distress, well nourished, well developed and well-appearing [No JVD] : no jugular venous distention [No Lymphadenopathy] : no lymphadenopathy [No Respiratory Distress] : no respiratory distress  [No Accessory Muscle Use] : no accessory muscle use [Normal Rate] : normal rate  [Regular Rhythm] : with a regular rhythm [Soft] : abdomen soft [No HSM] : no HSM [de-identified] : not congested

## 2022-01-19 LAB
25(OH)D3 SERPL-MCNC: 86.2 NG/ML
ALBUMIN SERPL ELPH-MCNC: 4.2 G/DL
ALP BLD-CCNC: 78 U/L
ALT SERPL-CCNC: 10 U/L
ANION GAP SERPL CALC-SCNC: 10 MMOL/L
AST SERPL-CCNC: 19 U/L
BASOPHILS # BLD AUTO: 0.05 K/UL
BASOPHILS NFR BLD AUTO: 1.2 %
BILIRUB SERPL-MCNC: 0.3 MG/DL
BUN SERPL-MCNC: 17 MG/DL
CALCIUM SERPL-MCNC: 9.7 MG/DL
CHLORIDE SERPL-SCNC: 104 MMOL/L
CHOLEST SERPL-MCNC: 155 MG/DL
CO2 SERPL-SCNC: 30 MMOL/L
CREAT SERPL-MCNC: 0.75 MG/DL
EOSINOPHIL # BLD AUTO: 0.18 K/UL
EOSINOPHIL NFR BLD AUTO: 4.4 %
ESTIMATED AVERAGE GLUCOSE: 111 MG/DL
GLUCOSE SERPL-MCNC: 88 MG/DL
HBA1C MFR BLD HPLC: 5.5 %
HCT VFR BLD CALC: 38.6 %
HDLC SERPL-MCNC: 65 MG/DL
HGB BLD-MCNC: 12.6 G/DL
IMM GRANULOCYTES NFR BLD AUTO: 0.2 %
LDLC SERPL CALC-MCNC: 81 MG/DL
LYMPHOCYTES # BLD AUTO: 1.27 K/UL
LYMPHOCYTES NFR BLD AUTO: 31 %
MAN DIFF?: NORMAL
MCHC RBC-ENTMCNC: 31 PG
MCHC RBC-ENTMCNC: 32.6 GM/DL
MCV RBC AUTO: 94.8 FL
MONOCYTES # BLD AUTO: 0.5 K/UL
MONOCYTES NFR BLD AUTO: 12.2 %
NEUTROPHILS # BLD AUTO: 2.09 K/UL
NEUTROPHILS NFR BLD AUTO: 51 %
NONHDLC SERPL-MCNC: 90 MG/DL
PLATELET # BLD AUTO: 315 K/UL
POTASSIUM SERPL-SCNC: 4.4 MMOL/L
PROT SERPL-MCNC: 6.5 G/DL
RBC # BLD: 4.07 M/UL
RBC # FLD: 12.6 %
SODIUM SERPL-SCNC: 143 MMOL/L
T4 FREE SERPL-MCNC: 1.2 NG/DL
TRIGL SERPL-MCNC: 45 MG/DL
TSH SERPL-ACNC: 1.49 UIU/ML
WBC # FLD AUTO: 4.1 K/UL

## 2022-01-20 LAB
A ALTERNATA IGE QN: <0.1 KUA/L
A FUMIGATUS IGE QN: <0.1 KUA/L
C HERBARUM IGE QN: <0.1 KUA/L
CAT DANDER IGE QN: <0.1 KUA/L
COW MILK IGE QN: <0.1 KUA/L
D FARINAE IGE QN: <0.1 KUA/L
DEPRECATED A ALTERNATA IGE RAST QL: 0
DEPRECATED A FUMIGATUS IGE RAST QL: 0
DEPRECATED C HERBARUM IGE RAST QL: 0
DEPRECATED CAT DANDER IGE RAST QL: 0
DEPRECATED COW MILK IGE RAST QL: 0
DEPRECATED D FARINAE IGE RAST QL: 0
DEPRECATED DOG DANDER IGE RAST QL: 0
DEPRECATED EGG WHITE IGE RAST QL: 0
DEPRECATED HOUSE DUST HS IGE RAST QL: 0
DEPRECATED P NOTATUM IGE RAST QL: 0
DOG DANDER IGE QN: <0.1 KUA/L
EGG WHITE IGE QN: <0.1 KUA/L
HOUSE DUST HS IGE QN: <0.1 KUA/L
P NOTATUM IGE QN: <0.1 KUA/L

## 2022-01-21 ENCOUNTER — APPOINTMENT (OUTPATIENT)
Dept: CT IMAGING | Facility: CLINIC | Age: 52
End: 2022-01-21
Payer: COMMERCIAL

## 2022-01-21 ENCOUNTER — OUTPATIENT (OUTPATIENT)
Dept: OUTPATIENT SERVICES | Facility: HOSPITAL | Age: 52
LOS: 1 days | End: 2022-01-21
Payer: COMMERCIAL

## 2022-01-21 DIAGNOSIS — Z98.890 OTHER SPECIFIED POSTPROCEDURAL STATES: Chronic | ICD-10-CM

## 2022-01-21 DIAGNOSIS — Z00.8 ENCOUNTER FOR OTHER GENERAL EXAMINATION: ICD-10-CM

## 2022-01-21 PROCEDURE — 70486 CT MAXILLOFACIAL W/O DYE: CPT

## 2022-01-21 PROCEDURE — 70486 CT MAXILLOFACIAL W/O DYE: CPT | Mod: 26

## 2022-04-05 NOTE — H&P PST ADULT - SENSORY
Personalized Preventive Plan for Juan F Weinberg - 4/5/2022  Medicare offers a range of preventive health benefits. Some of the tests and screenings are paid in full while other may be subject to a deductible, co-insurance, and/or copay. Some of these benefits include a comprehensive review of your medical history including lifestyle, illnesses that may run in your family, and various assessments and screenings as appropriate. After reviewing your medical record and screening and assessments performed today your provider may have ordered immunizations, labs, imaging, and/or referrals for you. A list of these orders (if applicable) as well as your Preventive Care list are included within your After Visit Summary for your review. Other Preventive Recommendations:    · A preventive eye exam performed by an eye specialist is recommended every 1-2 years to screen for glaucoma; cataracts, macular degeneration, and other eye disorders. · A preventive dental visit is recommended every 6 months. · Try to get at least 150 minutes of exercise per week or 10,000 steps per day on a pedometer . · Order or download the FREE \"Exercise & Physical Activity: Your Everyday Guide\" from [employer]. Call 7-175.508.4746 or search [employer] online. · You need 7031-3208 mg of calcium and 0391-3936 IU of vitamin D per day. It is possible to meet your calcium requirement with diet alone, but a vitamin D supplement is usually necessary to meet this goal.  · When exposed to the sun, use a sunscreen that protects against both UVA and UVB radiation with an SPF of 30 or greater. Reapply every 2 to 3 hours or after sweating, drying off with a towel, or swimming. · Always wear a seat belt when traveling in a car. Always wear a helmet when riding a bicycle or motorcycle. fingers right hand, sensation intact to  touch

## 2022-05-24 ENCOUNTER — RX RENEWAL (OUTPATIENT)
Age: 52
End: 2022-05-24

## 2022-07-18 NOTE — ASU PREOP CHECKLIST - SELECT TESTS ORDERED
Airway       Patient location during procedure: OR       Procedure Start/Stop Times: 7/18/2022 7:36 AM  Staff -        Anesthesiologist:  Isaac Joya MD       CRNA: Fortino Reid APRN CRNA       Other Anesthesia Staff: Jose White       Performed By: SRNA  Consent for Airway        Urgency: elective  Indications and Patient Condition       Indications for airway management: paige-procedural       Induction type:intravenous       Mask difficulty assessment: 1 - vent by mask    Final Airway Details       Final airway type: endotracheal airway       Successful airway: ETT - single  Endotracheal Airway Details        ETT size (mm): 7.0       Cuffed: yes       Successful intubation technique: direct laryngoscopy       DL Blade Type: MAC 3       Grade View of Cords: 2       Adjucts: stylet       Position: Center       Measured from: gums/teeth       Secured at (cm): 21       Bite block used: None    Post intubation assessment        Placement verified by: capnometry, equal breath sounds and chest rise        Number of attempts at approach: 1       Secured with: silk tape       Ease of procedure: easy       Dentition: Intact and Unchanged    Medication(s) Administered   Medication Administration Time: 7/18/2022 7:36 AM       UCG/CBC

## 2022-08-15 ENCOUNTER — RESULT REVIEW (OUTPATIENT)
Age: 52
End: 2022-08-15

## 2022-11-18 ENCOUNTER — NON-APPOINTMENT (OUTPATIENT)
Age: 52
End: 2022-11-18

## 2023-01-24 ENCOUNTER — RX RENEWAL (OUTPATIENT)
Age: 53
End: 2023-01-24

## 2023-01-24 RX ORDER — LINACLOTIDE 290 UG/1
290 CAPSULE, GELATIN COATED ORAL
Qty: 90 | Refills: 1 | Status: ACTIVE | COMMUNITY
Start: 2021-04-27 | End: 1900-01-01

## 2023-01-30 ENCOUNTER — APPOINTMENT (OUTPATIENT)
Dept: MRI IMAGING | Facility: CLINIC | Age: 53
End: 2023-01-30
Payer: COMMERCIAL

## 2023-01-30 ENCOUNTER — OUTPATIENT (OUTPATIENT)
Dept: OUTPATIENT SERVICES | Facility: HOSPITAL | Age: 53
LOS: 1 days | End: 2023-01-30
Payer: COMMERCIAL

## 2023-01-30 DIAGNOSIS — Z98.890 OTHER SPECIFIED POSTPROCEDURAL STATES: Chronic | ICD-10-CM

## 2023-01-30 DIAGNOSIS — Z00.8 ENCOUNTER FOR OTHER GENERAL EXAMINATION: ICD-10-CM

## 2023-01-30 PROCEDURE — C8937: CPT

## 2023-01-30 PROCEDURE — 77049 MRI BREAST C-+ W/CAD BI: CPT | Mod: 26

## 2023-01-30 PROCEDURE — A9585: CPT

## 2023-01-30 PROCEDURE — C8908: CPT

## 2023-03-08 ENCOUNTER — NON-APPOINTMENT (OUTPATIENT)
Age: 53
End: 2023-03-08

## 2023-05-15 ENCOUNTER — NON-APPOINTMENT (OUTPATIENT)
Age: 53
End: 2023-05-15

## 2023-07-27 ENCOUNTER — NON-APPOINTMENT (OUTPATIENT)
Age: 53
End: 2023-07-27

## 2023-11-08 ENCOUNTER — EMERGENCY (EMERGENCY)
Facility: HOSPITAL | Age: 53
LOS: 1 days | Discharge: HOME | End: 2023-11-08
Attending: STUDENT IN AN ORGANIZED HEALTH CARE EDUCATION/TRAINING PROGRAM | Admitting: STUDENT IN AN ORGANIZED HEALTH CARE EDUCATION/TRAINING PROGRAM
Payer: COMMERCIAL

## 2023-11-08 VITALS
DIASTOLIC BLOOD PRESSURE: 79 MMHG | HEART RATE: 59 BPM | WEIGHT: 128.09 LBS | RESPIRATION RATE: 14 BRPM | SYSTOLIC BLOOD PRESSURE: 115 MMHG | OXYGEN SATURATION: 98 % | TEMPERATURE: 97 F | HEIGHT: 65 IN

## 2023-11-08 VITALS — SYSTOLIC BLOOD PRESSURE: 125 MMHG | DIASTOLIC BLOOD PRESSURE: 71 MMHG

## 2023-11-08 DIAGNOSIS — Z98.890 OTHER SPECIFIED POSTPROCEDURAL STATES: Chronic | ICD-10-CM

## 2023-11-08 PROCEDURE — 72125 CT NECK SPINE W/O DYE: CPT | Mod: 26,MA

## 2023-11-08 PROCEDURE — 73110 X-RAY EXAM OF WRIST: CPT

## 2023-11-08 PROCEDURE — 70450 CT HEAD/BRAIN W/O DYE: CPT | Mod: MA

## 2023-11-08 PROCEDURE — 73090 X-RAY EXAM OF FOREARM: CPT

## 2023-11-08 PROCEDURE — 73110 X-RAY EXAM OF WRIST: CPT | Mod: 26,50

## 2023-11-08 PROCEDURE — 93010 ELECTROCARDIOGRAM REPORT: CPT

## 2023-11-08 PROCEDURE — 70486 CT MAXILLOFACIAL W/O DYE: CPT | Mod: 26,MA

## 2023-11-08 PROCEDURE — 70450 CT HEAD/BRAIN W/O DYE: CPT | Mod: 26,MA

## 2023-11-08 PROCEDURE — 73030 X-RAY EXAM OF SHOULDER: CPT | Mod: 26,RT

## 2023-11-08 PROCEDURE — 99284 EMERGENCY DEPT VISIT MOD MDM: CPT | Mod: 25

## 2023-11-08 PROCEDURE — 71250 CT THORAX DX C-: CPT | Mod: MA

## 2023-11-08 PROCEDURE — 93005 ELECTROCARDIOGRAM TRACING: CPT

## 2023-11-08 PROCEDURE — 71250 CT THORAX DX C-: CPT | Mod: 26,MA

## 2023-11-08 PROCEDURE — 72125 CT NECK SPINE W/O DYE: CPT | Mod: MA

## 2023-11-08 PROCEDURE — 73090 X-RAY EXAM OF FOREARM: CPT | Mod: 26,LT

## 2023-11-08 PROCEDURE — 70486 CT MAXILLOFACIAL W/O DYE: CPT | Mod: MA

## 2023-11-08 PROCEDURE — 99285 EMERGENCY DEPT VISIT HI MDM: CPT

## 2023-11-08 PROCEDURE — 73030 X-RAY EXAM OF SHOULDER: CPT

## 2023-11-08 RX ORDER — ACETAMINOPHEN 500 MG
975 TABLET ORAL ONCE
Refills: 0 | Status: COMPLETED | OUTPATIENT
Start: 2023-11-08 | End: 2023-11-08

## 2023-11-08 RX ADMIN — Medication 975 MILLIGRAM(S): at 19:12

## 2023-11-08 RX ADMIN — Medication 975 MILLIGRAM(S): at 19:55
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
## 2023-11-08 NOTE — ED PROVIDER NOTE - NSFOLLOWUPINSTRUCTIONS_ED_ALL_ED_FT
Please follow up with your Primary Care Physician and any specialists as discussed.  You can take acetaminophen (Tylenol) for your pain. It is available over the counter. You can take up to 1 gram (three 325mg tablets or two 500mg tablets) every 4-6 hours as needed.    You can also take an NSAID (non-steroidal anti-inflammatory drug) such as ibuprofen (Motrin, Advil), or naproxen (Aleve) for your pain. These are available over the counter. You can take 3 tablets of ibuprofen (200mg each) every 6 hours or 2 tablets of naproxen (220mg each) every 12 hours. Do no mix NSAIDs such as ibuprofen, diclofenac, ketorolac, and naproxen. Please take as needed and please take with food.    You can alternate acetaminophen and a NSAID to help further with pain.     If your symptoms persist or worsen, please seek care. Either return to the Emergency Department, go to urgent care or see your primary care doctor.  Please refer to general information and instructions attached or below:     Sprain    WHAT YOU NEED TO KNOW:    A sprain happens when a ligament is stretched or torn. Ligaments are tough tissues that connect bones. Ligaments support your joints and keep your bones in place. They allow you to lift, lower, or rotate your arms and legs. A sprain may involve one or more ligaments.     DISCHARGE INSTRUCTIONS:    Return to the emergency department if:     You have numbness or tingling below the injury, such as in your fingers or toes.      The skin over your sprained area is blue or pale.       Your pain has increased or returned, even after you take pain medicine.    Contact your healthcare provider if:     Your symptoms do not better.      Your swelling has increased or returned.      Your joint becomes more weak or unstable.      You have questions or concerns about your condition or care.    Medicines:     Prescription pain medicine may be given. Ask how to take this medicine safely.      Acetaminophen decreases pain and fever. It is available without a doctor's order. Ask how much to take and how often to take it. Follow directions. Acetaminophen can cause liver damage if not taken correctly.      NSAIDs, such as ibuprofen, help decrease swelling, pain, and fever. This medicine is available with or without a doctor's order. NSAIDs can cause stomach bleeding or kidney problems in certain people. If you take blood thinner medicine, always ask your healthcare provider if NSAIDs are safe for you. Always read the medicine label and follow directions.      Take your medicine as directed. Contact your healthcare provider if you think your medicine is not helping or if you have side effects. Tell him or her if you are allergic to any medicine. Keep a list of the medicines, vitamins, and herbs you take. Include the amounts, and when and why you take them. Bring the list or the pill bottles to follow-up visits. Carry your medicine list with you in case of an emergency.    Support devices: Support services, such as an elastic bandage, splint, brace, or cast may be needed. These devices limit your movement and protect your joint. You may need to use crutches if the sprain is in your leg. This will help decrease your pain as you move around.     Self-care:     Rest your joint so that it can heal. Return to normal activities as directed.      Apply ice on your injury for 15 to 20 minutes every hour or as directed. Use an ice pack, or put crushed ice in a plastic bag. Cover it with a towel. Ice helps prevent tissue damage and decreases swelling and pain.      Compress the injured area as directed. Ask your healthcare provider if you should wrap an elastic bandage around your injured ligament. An elastic bandage provides support and helps decrease swelling and movement so your joint can heal.       Elevate the injured area above the level of your heart as often as you can. This will help decrease swelling and pain. Prop the injured area on pillows or blankets to keep it elevated comfortably.     Physical therapy: A physical therapist teaches you exercises to help improve movement and strength, and to decrease pain.     Prevent another sprain: Regular exercise can strengthen your muscles and help prevent another injury. Do the following before you begin or return to regular exercise or sports training:     Ask your healthcare provider about the activities you can do. Find out how long your ligament needs to heal. Do not do any physical activity until your healthcare provider says it is okay. If you start activity too soon, you may develop a more serious injury.       Always warm up and stretch before your regular exercise, sport, or physical activity.       Take it slow. Slowly increase how often and how long you exercise or train. Sudden increases in how often you train may cause you to overstretch or tear your ligament.       Use the right equipment. Always wear shoes that fit well and are made for the activity that you are doing. You may also use ankle supports, elbow and knee pads, or braces.     Follow up with your healthcare provider as directed: Write down your questions so you remember to ask them during your visits.

## 2023-11-08 NOTE — ED PROVIDER NOTE - CARE PLAN
1 Principal Discharge DX:	Right wrist sprain  Secondary Diagnosis:	Fall  Secondary Diagnosis:	Facial injury  Secondary Diagnosis:	Head injury  Secondary Diagnosis:	Shoulder pain, right

## 2023-11-08 NOTE — ED PROVIDER NOTE - PHYSICAL EXAMINATION
Vital signs as available reviewed.  General:  Comfortable, no acute distress.  Head:  Normocephalic, + right chin tenderness to palpation with ecchymosis and step off.  Eyes:  Conjunctiva pink, no icterus. Pupils equal, round, reactive to light, extra-occular eye movements intact.  Abdomen:  Soft, non-tender.  Musculoskeletal:  Minimal c spine tenderness. No tenderness to palpation over t/l spine. + tenderness to palpation over bilateral wrists, right shoulder > left, upper bilateral chest wall.   Neurologic: Alert and oriented, moving all extremities. Sensation intact.  Skin:  Warm and dry. No skin break.

## 2023-11-08 NOTE — ED PROVIDER NOTE - PATIENT PORTAL LINK FT
You can access the FollowMyHealth Patient Portal offered by [facility] by registering at the following website: [email or web address]. By joining [employer]’s FollowMyHealth portal, you will also be able to view your health information using other applications (apps) compatible with our system.
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
## 2023-11-08 NOTE — ED ADULT NURSE NOTE - DISTAL EXTREMITY CAPILLARY REFILL
2 seconds or less Libtayo Counseling- I discussed with the patient the risks of Libtayo including but not limited to nausea, vomiting, diarrhea, and bone or muscle pain.  The patient verbalized understanding of the proper use and possible adverse effects of Libtayo.  All of the patient's questions and concerns were addressed.

## 2023-11-08 NOTE — ED ADULT NURSE NOTE - JUGULAR VENOUS DISTENTION
----- Message from Zoila Rod LPN sent at 12/13/2021  4:10 PM EST -----  Result Note    Details      Reading Physician Reading Date Result Priority  Christian Vargas MD  [phone] 12/10/2021 Routine    Result Text  1.  Adequate functional capacity without chest pain.  The patient exercised 12 minutes on Baltazar protocol to a heart rate of 197, systolic blood pressure 187, a rate-pressure product approaching 37,000, and an O2 consumption of 12.8 METS.  The patient achieved 96% of age adjusted maximum predicted heart rate and described dyspnea in stage IV.     2.  Physiologic heart rate and blood pressure responses to exercise.     3.  No EKG evidence of ischemia.     4.  No exercise-induced dysrhythmia.    ----- Message -----  From: Abraham Thompson PA  Sent: 12/13/2021   1:38 PM EST  To: Zoila Rod LPN    Routine follow-up     absent

## 2023-11-08 NOTE — ED ADULT NURSE NOTE - OBJECTIVE STATEMENT
Patient received complaining of fall off a scooter while on vacation last week, states has bruising to chin, right hip, left knee, and generalized chest discomfort from the fall. EKG done. States not on blood thinners. Patient is AOx4, safety precautions in place, awaiting evaluation

## 2024-01-12 NOTE — ED ADULT NURSE NOTE - TEMPLATE
Cardiac yaniv. upper extremity Active ROM was WNL (within normal limits)/bilateral lower extremity Active ROM was WNL (within normal limits)

## 2024-11-20 NOTE — ASU PREOP CHECKLIST - AS BP NONINV METHOD
Chief Complaint   Patient presents with     Oncology Clinic Visit     Return Prostate Ca     Blood Draw            Vitals taken, labs collected from left hand via .  Pt tolerated procedure.  Checked in for next appt.     Denise Ferrari RN     Body Location Override (Optional - Billing Will Still Be Based On Selected Body Map Location If Applicable): right dorsal hand Size Of Lesion: 1mm Morphology Per Location (Optional): Dark brown macule (stable) Detail Level: Detailed Size Of Lesion: 4mm brown macule electronic

## 2024-12-23 NOTE — ED ADULT NURSE NOTE - NSHISCREENINGQ1_ED_A_ED
Labs: Monthly x 3 months with urine protein/creatinine then every 3 months if stable   - CD 19 and PRINCESS with next labs      Follow up:  Dr Pritchett 6 months      Medication Changes:  no changes      Miscellaneous:   - no biopsy in Jan    - F/U with dermatology    Annual dermatology appointments are important as you are at higher risk for skin cancer and your risk increases annually.  The key to successfully treating skin cancer is early detection and treatment:   -Please avoid direct sun light as much as possible.    -Wear broad spectrum sunscreen at least SPF 30 and lip balm SPF 15 or higher and reapply frequently.   -Wear a hat, long sleeves and UV- protective sunglasses when in sun.    -call with any areas/spots of concern    
No

## 2025-01-16 NOTE — H&P PST ADULT - NSANTHNECKRD_ENT_A_CORE
Elevated risk of harm given poor insight, manic exacerbation  protective - compliant with treatment and recs, supportive family, good response to tx in the past No

## 2025-03-05 NOTE — ED ADULT TRIAGE NOTE - ISOLATION INDICATION AIRBORNE CONTACT
Called patient and left her a message to call me regarding the procedure and left her my direct number.  
SARS

## 2025-07-07 NOTE — ASU DISCHARGE PLAN (ADULT/PEDIATRIC). - PATIENT/GUARDIAN NAME (SIGNATURE): ____________________________________
